# Patient Record
Sex: FEMALE | Employment: FULL TIME | URBAN - METROPOLITAN AREA
[De-identification: names, ages, dates, MRNs, and addresses within clinical notes are randomized per-mention and may not be internally consistent; named-entity substitution may affect disease eponyms.]

---

## 2020-03-04 ENCOUNTER — HOSPITAL ENCOUNTER (OUTPATIENT)
Dept: LAB | Age: 25
Discharge: HOME | End: 2020-03-04

## 2020-03-04 PROCEDURE — 87086 URINE CULTURE/COLONY COUNT: CPT

## 2020-03-06 LAB
BACTERIA SPEC CULT: NORMAL
CC UR VC: NORMAL
SERVICE CMNT-IMP: NORMAL

## 2020-03-18 ENCOUNTER — HOSPITAL ENCOUNTER (OUTPATIENT)
Dept: LAB | Age: 25
Discharge: HOME | End: 2020-03-18

## 2020-03-18 PROCEDURE — 88175 CYTOPATH C/V AUTO FLUID REDO: CPT

## 2020-05-12 ENCOUNTER — HOSPITAL ENCOUNTER (OUTPATIENT)
Dept: ULTRASOUND IMAGING | Age: 25
Discharge: HOME | End: 2020-05-12
Payer: SUBSIDIZED

## 2020-05-12 DIAGNOSIS — R10.2 PELVIC PAIN: ICD-10-CM

## 2020-05-12 PROCEDURE — 76830 TRANSVAGINAL US NON-OB: CPT

## 2020-05-12 PROCEDURE — 76856 US EXAM PELVIC COMPLETE: CPT

## 2020-11-30 ENCOUNTER — OFFICE VISIT (OUTPATIENT)
Dept: OBGYN CLINIC | Age: 25
End: 2020-11-30

## 2020-11-30 DIAGNOSIS — N92.6 MISSED MENSES: Primary | ICD-10-CM

## 2020-11-30 DIAGNOSIS — N91.2 AMENORRHEA: ICD-10-CM

## 2020-11-30 DIAGNOSIS — N89.8 VAGINAL DISCHARGE: ICD-10-CM

## 2020-11-30 LAB
HCG URINE, QL. (POC): NEGATIVE
VALID INTERNAL CONTROL?: YES

## 2020-11-30 PROCEDURE — 99202 OFFICE O/P NEW SF 15 MIN: CPT | Performed by: OBSTETRICS & GYNECOLOGY

## 2020-11-30 PROCEDURE — 81025 URINE PREGNANCY TEST: CPT | Performed by: OBSTETRICS & GYNECOLOGY

## 2020-11-30 RX ORDER — MEDROXYPROGESTERONE ACETATE 10 MG/1
10 TABLET ORAL DAILY
Qty: 10 TAB | Refills: 11 | Status: SHIPPED | OUTPATIENT
Start: 2020-11-30 | End: 2022-01-17 | Stop reason: SDUPTHER

## 2020-11-30 NOTE — PROGRESS NOTES
Amenorrhea note      Marina Tran is a 30 y.o. female who complains of absence of menses. Her current method of family planning is none. The patient is sexually active. She developed this problem approximately 1 year ago. She has only had 3 periods this year. Associated symptoms include pelvic pain. Alleviating factors: none    Aggravating factors: none      Her relevant past medical history:   No past medical history on file. No past surgical history on file. Social History     Occupational History    Not on file   Tobacco Use    Smoking status: Not on file   Substance and Sexual Activity    Alcohol use: Not on file    Drug use: Not on file    Sexual activity: Not on file     No family history on file.     No Known Allergies  Prior to Admission medications    Not on File        Review of Systems - History obtained from the patient  Constitutional: negative for weight loss, fever, night sweats  HEENT: negative for hearing loss, earache, congestion, snoring, sorethroat  CV: negative for chest pain, palpitations, edema  Resp: negative for cough, shortness of breath, wheezing  Breast: negative for breast lumps, nipple discharge, galactorrhea  GI: negative for change in bowel habits, abdominal pain, black or bloody stools  : negative for frequency, dysuria, hematuria  MSK: negative for back pain, joint pain, muscle pain  Skin: negative for itching, rash, hives  Neuro: negative for dizziness, headache, confusion, weakness  Psych: negative for anxiety, depression, change in mood  Heme/lymph: negative for bleeding, bruising, pallor      Objective:  Visit Vitals  LMP 08/05/2020 (Exact Date) Comment: irregular menses          PHYSICAL EXAMINATION    Constitutional  · Appearance: well-nourished, well developed, alert, in no acute distress    HENT  · Head and Face: appears normal    Neck  · Inspection/Palpation: normal appearance, no masses or tenderness  · Lymph Nodes: no lymphadenopathy present  · Thyroid: gland size normal, nontender, no nodules or masses present on palpation    Breasts  · Inspection of Breasts: breasts symmetrical, no skin changes, no discharge present, nipple appearance normal, no skin retraction present  · Palpation of Breasts and Axillae: no masses present on palpation, no breast tenderness  · Axillary Lymph Nodes: no lymphadenopathy present    Gastrointestinal  · Abdominal Examination: abdomen non-tender to palpation, normal bowel sounds, no masses present  · Liver and spleen: no hepatomegaly present, spleen not palpable  · Hernias: no hernias identified    Genitourinary  · External Genitalia: normal appearance for age, + discharge present, no tenderness present, no inflammatory lesions present, no masses present, no atrophy present  · Vagina: normal vaginal vault without central or paravaginal defects, + discharge present, no inflammatory lesions present, no masses present  · Bladder: non-tender to palpation  · Urethra: appears normal  · Cervix: normal   · Uterus: normal size, shape and consistency  · Adnexa: no adnexal tenderness present, no adnexal masses present  · Perineum: perineum within normal limits, no evidence of trauma, no rashes or skin lesions present  · Anus: anus within normal limits, no hemorrhoids present  · Inguinal Lymph Nodes: no lymphadenopathy present    Skin  · General Inspection: no rash, no lesions identified    Neurologic/Psychiatric  · Mental Status:  · Orientation: grossly oriented to person, place and time  · Mood and Affect: mood normal, affect appropriate    Assessment/Plan:   Amenorrhea, possibly due to anovulatrion. Will check pcos labs. Discussed starting metoformin if positive. Will start Provera (can take monthly to get a period). Discussed ovulation predictor kits. If not pregnant in 3 months will notify for 21 day progesterone and possible Femara, and SA.     Vaginal discharge- will fu with Nuswab    Instructions given to pt. Handouts given to pt.

## 2020-12-01 LAB
TESTOST FREE SERPL-MCNC: 2.8 PG/ML (ref 0–4.2)
TESTOST SERPL-MCNC: 40 NG/DL (ref 8–48)
TSH SERPL-ACNC: 0.77 UIU/ML (ref 0.45–4.5)

## 2020-12-02 LAB
A VAGINAE DNA VAG QL NAA+PROBE: NORMAL SCORE
BVAB2 DNA VAG QL NAA+PROBE: NORMAL SCORE
C ALBICANS DNA VAG QL NAA+PROBE: NEGATIVE
C GLABRATA DNA VAG QL NAA+PROBE: NEGATIVE
MEGA1 DNA VAG QL NAA+PROBE: NORMAL SCORE

## 2020-12-03 LAB
17OHP SERPL-MCNC: 36 NG/DL
ALBUMIN SERPL-MCNC: 4.2 G/DL (ref 3.5–5)
ALBUMIN/GLOB SERPL: 1.1 {RATIO} (ref 1.1–2.2)
ALP SERPL-CCNC: 68 U/L (ref 45–117)
ALT SERPL-CCNC: 22 U/L (ref 12–78)
ANION GAP SERPL CALC-SCNC: 7 MMOL/L (ref 5–15)
AST SERPL-CCNC: 17 U/L (ref 15–37)
BILIRUB SERPL-MCNC: 0.4 MG/DL (ref 0.2–1)
BUN SERPL-MCNC: 13 MG/DL (ref 6–20)
BUN/CREAT SERPL: 13 (ref 12–20)
CALCIUM SERPL-MCNC: 9.7 MG/DL (ref 8.5–10.1)
CHLORIDE SERPL-SCNC: 106 MMOL/L (ref 97–108)
CO2 SERPL-SCNC: 26 MMOL/L (ref 21–32)
CREAT SERPL-MCNC: 1.02 MG/DL (ref 0.55–1.02)
GLOBULIN SER CALC-MCNC: 3.9 G/DL (ref 2–4)
GLUCOSE SERPL-MCNC: 84 MG/DL (ref 65–100)
POTASSIUM SERPL-SCNC: 4.4 MMOL/L (ref 3.5–5.1)
PROLACTIN SERPL-MCNC: 8.5 NG/ML
PROT SERPL-MCNC: 8.1 G/DL (ref 6.4–8.2)
SODIUM SERPL-SCNC: 139 MMOL/L (ref 136–145)

## 2020-12-14 ENCOUNTER — TELEPHONE (OUTPATIENT)
Dept: OBGYN CLINIC | Age: 25
End: 2020-12-14

## 2020-12-14 NOTE — TELEPHONE ENCOUNTER
Call received 10:40am    [place]  Interpreting service Ella Franklinter [phone] to assist with communication      30year old patient last seen in the office on 11/30/2020     Patient was advised of MD reviewed labs results and verbalized understanding.( see result notes)    Patient calling to say that she took the medication as prescribed and had a period from  12 2-12/8 and the bleeding has restarted on 12/11 and continues currently. Patient reports changing her pad every 4 hours and cramping at 8 on the pain scale of 1-10.     Patient has not done a upt    Patient is wondering how to proceed    Please advise

## 2020-12-14 NOTE — TELEPHONE ENCOUNTER
Status interpreting service number [id number] [facility]      Patient advised of MD recommendations and verbalized understanding. Patient will check upt and will then take provera at beginning of each month. Patient verbalized understanding.

## 2020-12-14 NOTE — TELEPHONE ENCOUNTER
I would recommend she check a upt. Since she hasn't had a period in a long time she may have irregular bleeding now that she is starting the provera. She can either take the provera the first 10 days of each month to continue to get a period monthly or she can wait and see waht happens over the next 1-2 months and see if continue to come on her period naturally.

## 2021-01-05 NOTE — TELEPHONE ENCOUNTER
01/05/2021  10:13 am-  Follow up phone call for continuation of problems. Patient interpretor service \"Germain\" [id number]. patien tsaid that she had vaginal bleeding (dark brown) light bleeding from 12/23/20-12/29/20 where she had to use 2 pads daily. She is having \"breast tenderness/breasts feel \"heavy\", bilateral breast nipple discharge, hot flashes, feeling \"weak\" and dizziness/nauseated. Pain in mid abdomen 7/10 pain score. Pain in lower back and pain in top of both breasts. She said that she started Provera 1/1/21 and the breast symptoms started after this.       UPT was negative 12/27/20    PLEASE ADVISE

## 2021-01-05 NOTE — TELEPHONE ENCOUNTER
[place] interpretor number [phone] utilized and patient advised again to check upt. Hold medication to start cycle until she knows if negative. If negative, call to schedule next available appointment for consultation.

## 2021-03-03 ENCOUNTER — TELEPHONE (OUTPATIENT)
Dept: OBGYN CLINIC | Age: 26
End: 2021-03-03

## 2021-03-03 NOTE — TELEPHONE ENCOUNTER
Patient is calling triage line asking for [place]istan[place] interpretor. Interpretor contacted \"Juju\" [phone]      Patient was calling to say that she did not purchase the Provera last month and did not get her period but did have a headache. She said she is going to want to have the medication to take this month to start her cycle like she is supposed to take. The Provera was sent on 11/30/20 and has 10 tabs with 11 refills. She was advised to reach out to [address] to have them refill her RX. If no improvement, will need to come in to see Dr. Sonu Kaur.

## 2021-04-05 ENCOUNTER — TELEPHONE (OUTPATIENT)
Dept: OBGYN CLINIC | Age: 26
End: 2021-04-05

## 2021-04-05 NOTE — TELEPHONE ENCOUNTER
Calling for [employer]. Wants to discuss Provera and hair loss, fatigue and acne. Advised needs appt to discuss if having problems. Needs interpretor.     Per Rebecca offer  [id number] at 910 am   or Wed 4/14/21  930    Patient accepted appt  for Wed 4/14/21

## 2021-04-14 ENCOUNTER — OFFICE VISIT (OUTPATIENT)
Dept: OBGYN CLINIC | Age: 26
End: 2021-04-14

## 2021-04-14 VITALS — SYSTOLIC BLOOD PRESSURE: 125 MMHG | WEIGHT: 174.8 LBS | DIASTOLIC BLOOD PRESSURE: 74 MMHG

## 2021-04-14 DIAGNOSIS — N91.2 AMENORRHEA: Primary | ICD-10-CM

## 2021-04-14 DIAGNOSIS — L65.9 HAIR LOSS: ICD-10-CM

## 2021-04-14 PROCEDURE — 99213 OFFICE O/P EST LOW 20 MIN: CPT | Performed by: OBSTETRICS & GYNECOLOGY

## 2021-04-14 NOTE — PROGRESS NOTES
Problem Visit-Limited    Chief Complaint   Follow-up    Patient complains of taking the provera for 7 months and 3 months ago noticed that she feels fatigue, has hair loss, and acne. Patient also states that she still hasn't gotten a regular cycle. She has been taking Provera months (1st 10 days) she has had minimal spotting when she stops. Trying for pregnancy      No past medical history on file. No past surgical history on file. Social History     Occupational History    Not on file   Tobacco Use    Smoking status: Not on file   Substance and Sexual Activity    Alcohol use: Not on file    Drug use: Not on file    Sexual activity: Not on file     No family history on file. No Known Allergies  Prior to Admission medications    Medication Sig Start Date End Date Taking? Authorizing Provider   medroxyPROGESTERone (PROVERA) 10 mg tablet Take 1 Tab by mouth daily.  11/30/20   Issac Velasquez MD        Review of Systems: History obtained from the patient  Constitutional: negative for weight loss, fever, night sweats  Breast: negative for breast lumps, nipple discharge, galactorrhea  GI: negative for change in bowel habits, abdominal pain, black or bloody stools  : negative for frequency, dysuria, hematuria, vaginal discharge  MSK: negative for back pain, joint pain, muscle pain  Skin: negative for itching, rash, hives  Psych: negative for anxiety, depression, change in mood      Objective:  Visit Vitals  /74   Wt 174 lb 12.8 oz (79.3 kg)       Physical Exam:     Constitutional  · Appearance: well-nourished, well developed, alert, in no acute distress    Gastrointestinal  · Abdominal Examination: abdomen non-tender to palpation, normal bowel sounds, no masses present  · Liver and spleen: no hepatomegaly present, spleen not palpable  · Hernias: no hernias identified    Skin  · General Inspection: no rash, no lesions identified    Neurologic/Psychiatric  · Mental Status:  · Orientation: grossly oriented to person, place and time  · Mood and Affect: mood normal, affect appropriate    Assessment/Plan:  Amenorrhea- will repeat tsh and prolactin, will check tsh/fsh/estradiol, 21 day progesterone. If all normal, then will start femara with next cycle if anovulatory. RTO prn if symptoms persist or worsen. Instructions given to pt. Handouts given to pt.

## 2021-04-28 ENCOUNTER — LAB ONLY (OUTPATIENT)
Dept: OBGYN CLINIC | Age: 26
End: 2021-04-28

## 2021-04-28 DIAGNOSIS — N91.2 AMENORRHEA: Primary | ICD-10-CM

## 2021-04-29 LAB
FSH SERPL-ACNC: 4.6 MIU/ML
LH SERPL-ACNC: 17.9 MIU/ML
PROLACTIN SERPL-MCNC: 11.4 NG/ML
T4 SERPL-MCNC: 8.3 UG/DL (ref 4.8–13.9)
TSH SERPL DL<=0.05 MIU/L-ACNC: 0.81 UIU/ML (ref 0.36–3.74)

## 2021-04-30 LAB
ESTRADIOL SERPL-MCNC: 27.7 PG/ML
PROGEST SERPL-MCNC: 0.2 NG/ML
T3RU NFR SERPL: 27 % (ref 24–39)

## 2021-05-20 RX ORDER — LETROZOLE 2.5 MG/1
2.5 TABLET, FILM COATED ORAL DAILY
Qty: 5 TABLET | Refills: 0 | Status: SHIPPED | OUTPATIENT
Start: 2021-05-20 | End: 2021-05-21

## 2021-05-20 NOTE — TELEPHONE ENCOUNTER
----- Message from Earline Kahn MD sent at 4/30/2021  3:38 PM EDT -----  Results abnormal, notify pt of results, anovulatory. Can start femara with next cycle 2.5 mg 1 po daily x 5 days take cycle day 3-7 #5 refills 0. If no pregnancy after 1 month will increase the 5 mg and then 7.5 mg the next month. If still no pregnancy after 3 months then should f/u with PETRA.

## 2021-05-20 NOTE — TELEPHONE ENCOUNTER
Pt notified using . All questions answered and pt verbalized understanding. rx pended to preferred pharmacy.

## 2021-05-21 RX ORDER — LETROZOLE 2.5 MG/1
2.5 TABLET, FILM COATED ORAL DAILY
Qty: 5 TABLET | Refills: 0 | Status: SHIPPED | OUTPATIENT
Start: 2021-05-21 | End: 2022-01-17

## 2021-06-30 ENCOUNTER — HOSPITAL ENCOUNTER (OUTPATIENT)
Dept: LAB | Age: 26
Discharge: HOME | End: 2021-06-30

## 2021-06-30 LAB
COMMENT, HOLDF: NORMAL
SAMPLES BEING HELD,HOLD: NORMAL

## 2021-06-30 PROCEDURE — 36415 COLL VENOUS BLD VENIPUNCTURE: CPT

## 2021-06-30 PROCEDURE — 80048 BASIC METABOLIC PNL TOTAL CA: CPT

## 2021-06-30 PROCEDURE — 84443 ASSAY THYROID STIM HORMONE: CPT

## 2021-07-01 LAB
ANION GAP SERPL CALC-SCNC: 4 MMOL/L (ref 5–15)
BUN SERPL-MCNC: 13 MG/DL (ref 6–20)
BUN/CREAT SERPL: 14 (ref 12–20)
CALCIUM SERPL-MCNC: 9.3 MG/DL (ref 8.5–10.1)
CHLORIDE SERPL-SCNC: 107 MMOL/L (ref 97–108)
CO2 SERPL-SCNC: 28 MMOL/L (ref 21–32)
CREAT SERPL-MCNC: 0.96 MG/DL (ref 0.55–1.02)
GLUCOSE SERPL-MCNC: 78 MG/DL (ref 65–100)
POTASSIUM SERPL-SCNC: 4.4 MMOL/L (ref 3.5–5.1)
SODIUM SERPL-SCNC: 139 MMOL/L (ref 136–145)
TSH SERPL DL<=0.05 MIU/L-ACNC: 0.88 UIU/ML (ref 0.36–3.74)

## 2021-07-03 ENCOUNTER — HOSPITAL ENCOUNTER (EMERGENCY)
Age: 26
Discharge: HOME | End: 2021-07-03
Attending: EMERGENCY MEDICINE
Payer: SUBSIDIZED

## 2021-07-03 ENCOUNTER — APPOINTMENT (OUTPATIENT)
Dept: CT IMAGING | Age: 26
End: 2021-07-03
Attending: EMERGENCY MEDICINE
Payer: SUBSIDIZED

## 2021-07-03 VITALS
RESPIRATION RATE: 16 BRPM | BODY MASS INDEX: 32.74 KG/M2 | OXYGEN SATURATION: 100 % | WEIGHT: 177.91 LBS | TEMPERATURE: 97.3 F | DIASTOLIC BLOOD PRESSURE: 80 MMHG | HEART RATE: 67 BPM | HEIGHT: 62 IN | SYSTOLIC BLOOD PRESSURE: 128 MMHG

## 2021-07-03 DIAGNOSIS — M54.50 ACUTE BILATERAL LOW BACK PAIN WITHOUT SCIATICA: ICD-10-CM

## 2021-07-03 DIAGNOSIS — S09.90XA CLOSED HEAD INJURY, INITIAL ENCOUNTER: Primary | ICD-10-CM

## 2021-07-03 PROCEDURE — 99282 EMERGENCY DEPT VISIT SF MDM: CPT

## 2021-07-03 PROCEDURE — 70450 CT HEAD/BRAIN W/O DYE: CPT

## 2021-07-03 RX ORDER — LIDOCAINE 50 MG/G
PATCH TOPICAL
Qty: 5 EACH | Refills: 0 | Status: SHIPPED | OUTPATIENT
Start: 2021-07-03

## 2021-07-03 RX ORDER — NAPROXEN 500 MG/1
500 TABLET ORAL 2 TIMES DAILY WITH MEALS
Qty: 14 TABLET | Refills: 0 | Status: SHIPPED | OUTPATIENT
Start: 2021-07-03 | End: 2021-07-10

## 2021-07-03 RX ORDER — METHOCARBAMOL 750 MG/1
750 TABLET, FILM COATED ORAL 4 TIMES DAILY
Qty: 20 TABLET | Refills: 0 | Status: SHIPPED | OUTPATIENT
Start: 2021-07-03 | End: 2021-07-08

## 2021-07-03 NOTE — DISCHARGE INSTRUCTIONS
Since you have no primary care provider listed we will refer you to [address] on [facility]. They are open 7 days a week. They are a part of the [employer]. They will have access to x-rays and labs you had done here in the Emergency Room.
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
## 2021-07-23 ENCOUNTER — OFFICE VISIT (OUTPATIENT)
Dept: FAMILY MEDICINE CLINIC | Age: 26
End: 2021-07-23
Payer: SUBSIDIZED

## 2021-07-23 VITALS
WEIGHT: 181 LBS | BODY MASS INDEX: 33.31 KG/M2 | TEMPERATURE: 98.3 F | OXYGEN SATURATION: 98 % | RESPIRATION RATE: 18 BRPM | HEIGHT: 62 IN | HEART RATE: 70 BPM | DIASTOLIC BLOOD PRESSURE: 75 MMHG | SYSTOLIC BLOOD PRESSURE: 113 MMHG

## 2021-07-23 DIAGNOSIS — M54.50 ACUTE MIDLINE LOW BACK PAIN WITHOUT SCIATICA: ICD-10-CM

## 2021-07-23 DIAGNOSIS — S06.0X0A CONCUSSION WITHOUT LOSS OF CONSCIOUSNESS, INITIAL ENCOUNTER: Primary | ICD-10-CM

## 2021-07-23 PROCEDURE — 99203 OFFICE O/P NEW LOW 30 MIN: CPT | Performed by: STUDENT IN AN ORGANIZED HEALTH CARE EDUCATION/TRAINING PROGRAM
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
## 2021-07-23 NOTE — LETTER
NOTIFICATION RETURN TO WORK / SCHOOL    7/23/2021 10:32 AM    Ms. Sommer Hardy  [address] [facility] [address] [address]      To Whom It May Concern:    Sommer Hardy is currently under the care of [facility]. She will return to work/school on: pending her next visit on 7/30/21      If there are questions or concerns please have the patient contact our office.         Sincerely,        Burke Smith, DO
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
## 2021-07-23 NOTE — PATIENT INSTRUCTIONS
Conmoción cerebral: Instrucciones de cuidado  Concussion: Care Instructions  Instrucciones de cuidado    Rachell conmoción es un tipo de lesión cerebral. Ocurre cuando se recibe un violeta golpe en la jose l. El impacto puede sacudir o agitar el cerebro contra el cráneo. Mosses interrumpe las actividades normales del cerebro. Aunque podría tener cortadas o moretones en la jose l o la melodie, es posible que no tenga otras señales visibles de rachell lesión cerebral. En la mayoría de [place], [place] daño al cerebro debido a rachell conmoción cerebral no puede ser [employer] en pruebas johanna rachell tomografía computarizada (CT, por maurisio siglas en inglés) o rachell resonancia magnética nuclear (MRI, por maurisio siglas en [facility]). Es posible que arjun unas semanas tenga poca Renee Rg, problemas para dormir, dolor de jose l, zumbido Leatha [place] oídos o Tk. Es posible que [place] se sienta ansioso, malhumorado o deprimido. Puede tener problemas con la memoria y la concentración. Estos síntomas son comunes después de rachell conmoción. Deberían mejorar lentamente con el tiempo. A veces, esto lleva semanas o incluso meses. Alguna persona que viva con usted debe saber cómo cuidarlo. Por favor, comparta esta información con la persona que estará encargada de proporcionarle ayuda cuando sea necesario. La atención de seguimiento es rachell parte clave de iverson tratamiento y seguridad. Asegúrese de hacer y acudir a todas las citas, y llame a iverson médico si está teniendo problemas. También es rachell buena idea saber los resultados de maurisio exámenes y mantener rachell lista de los medicamentos que chanell. Cómo puede cuidarse en el hogar? Control del dolor  · Colóquese hielo o rachell compresa fría en la parte de la jose l que le duele de 10 a 20 minutos cada vez. Póngase un paño dennis entre el hielo y la piel. · Sea kaushik con los medicamentos. Maribel y siga todas las indicaciones de la [place].   ? Si el médico le recetó un analgésico (medicamento para el dolor), tómelo de la forma indicada. ? Si usted no está tomando un analgésico recetado, pregúntele a iverson médico si puede lillian un medicamento de [employer]. Recuperación  · Siga las instrucciones de iverson médico. Él o nishi le dirá si necesita que alguien lo vigile atentamente arjun las siguientes 24 horas o más Gracie. · El descanso es la mejor manera de recuperarse de rachell conmoción. Debe descansar iverson cuerpo y iverson cerebro:  ? Duerma lo suficiente por la noche. Y tome descansos arjun el día. ? Evite actividades que sam física o mentalmente exigentes. [place] incluye tareas domésticas, ejercicio, tareas escolares, videojuegos, mensajes de texto o usar la computadora. ? Puede que tenga que cambiar iverson horario de trabajo o de la escuela mientras se recupera. ? Reanude maurisio actividades normales en forma gradual. No intente hacer muchas actividades a la vez. · No brandon alcohol ni consuma drogas ilegales. El alcohol y las drogas ilegales pueden hacer más lenta iverson recuperación. Y pueden aumentar el riesgo de rachell segunda lesión cerebral.  · Evite hacer actividades que pudieran provocar otra conmoción cerebral. Siga las indicaciones de iverson médico sobre cómo volver gradualmente a la actividad y los deportes. · Pregúntele a iverson médico cuándo puede conducir un auto, montar en bicicleta u operar maquinarias. Cómo debe volver a hacer actividad? Iverson regreso a la actividad puede comenzar después de 1 o 2 días de descanso físico y mental. Después del reposo, usted puede aumentar iverson actividad gradualmente siempre y cuando no le cause nuevos síntomas ni le empeore los síntomas. Keshav Hasten y especialistas en conmoción cerebral sugieren pasos a seguir para volver a hacer deporte después de rachell conmoción. Utilice los siguientes pasos johanna guía. Usted debe avanzar lentamente por los siguientes niveles de [place]:  1. Actividad limitada.  Usted puede participar en actividades diarias siempre y cuando la actividad no aumente maurisio síntomas ni le cause nuevos síntomas. 2. Barbara Flavio ligera. [employer] [facility] puede incluir caminar, nadar o realizar otro ejercicio a menos del 70% de iverson frecuencia Dorotha Gell. No se incluye ningún tipo de entrenamiento de resistencia en deisy paso. 3. Ejercicio específico al deporte. [employer] [facility] incluye entrenamiento de correr o de patinar (según el deporte), christiana sin impacto en la jose l. 4. Ejercicios de entrenamiento sin contacto. [employer] [facility] incluye ejercicios de entrenamiento más complejos, johanna samantha Loo. Usted también puede comenzar un entrenamiento de resistencia ligero. 5. Práctica sin límites de contacto. Usted puede participar en el entrenamiento normal.  6. Regreso al juego normal. Deisy es el último paso y le permite participar en el juego normal.  Observe y siga atentamente iverson progreso. Debe lillian por lo menos 6 días para progresar desde la actividad ligera al juego normal.  Asegúrese de que pueda permanecer sin síntomas en cada nuevo nivel de actividad al menos 24 horas, o tanto tiempo johanna iverson médico le diga, antes de [employer]. Si andrea o más síntomas vuelven a aparecer, regrese a un nivel de actividad amy arjun al menos 24 horas. No avance hasta que todos los síntomas hayan desaparecido. Cuándo debe pedir ayuda? Llame al 911 en cualquier momento que considere que necesita atención de [place]. Por ejemplo, llame si:    · Tiene un episodio de convulsiones.     · Se desmayó (perdió el conocimiento).   · Se siente confuso o no puede mantenerse despierto. Llame a iverson médico ahora mismo o busque atención médica inmediata si:    · Tiene nuevo o peor vómito.     · Se siente menos alerta.     · Tiene nueva debilidad o entumecimiento en cualquier parte del cuerpo. Preste especial atención a los [facility], y asegúrese de comunicarse con iverson médico si:    · No mejora johanna esperaba.     · Tiene nuevos síntomas, johanna laura de [place], problemas para concentrarse o cambios en el estado de ánimo.    
Dónde puede encontrar más información en inglés? Ivana Clear a [email or web address].gray.com/  Enrique [id number] en la búsqueda para aprender más acerca de \"Conmoción cerebral: Instrucciones de cuidado. \"  Revisado: 4 agosto, 2020               Versión del contenido: 12.8  © 2006-2021 Healthwise, [employer]. Las instrucciones de cuidado fueron adaptadas bajo licencia por Good [facility] Connections (which disclaims liability or warranty for this information). Si usted tiene [place] afección médica o sobre estas instrucciones, siempre pregunte a iverson profesional de perez. [employer], [employer] niega toda garantía o responsabilidad por iversno uso de esta información.

## 2021-07-27 ENCOUNTER — TELEPHONE (OUTPATIENT)
Dept: FAMILY MEDICINE CLINIC | Age: 26
End: 2021-07-27

## 2021-07-27 NOTE — TELEPHONE ENCOUNTER
Last OV not faxed to [phone] per verbal request of Abelardo Marks with  [employer] workmen's comp .  Claim # [id number]      patrice phone # [phone] Breathing spontaneous and unlabored. Breath sounds clear and equal bilaterally with regular rhythm.

## 2021-08-03 ENCOUNTER — OFFICE VISIT (OUTPATIENT)
Dept: FAMILY MEDICINE CLINIC | Age: 26
End: 2021-08-03
Payer: SUBSIDIZED

## 2021-08-03 VITALS
BODY MASS INDEX: 33.75 KG/M2 | HEART RATE: 73 BPM | OXYGEN SATURATION: 99 % | DIASTOLIC BLOOD PRESSURE: 72 MMHG | RESPIRATION RATE: 16 BRPM | HEIGHT: 62 IN | WEIGHT: 183.4 LBS | SYSTOLIC BLOOD PRESSURE: 114 MMHG | TEMPERATURE: 97.9 F

## 2021-08-03 DIAGNOSIS — F43.0 ANXIETY IN ACUTE STRESS REACTION: ICD-10-CM

## 2021-08-03 DIAGNOSIS — S06.0X0A CONCUSSION WITHOUT LOSS OF CONSCIOUSNESS, INITIAL ENCOUNTER: Primary | ICD-10-CM

## 2021-08-03 DIAGNOSIS — M54.9 ACUTE UPPER BACK PAIN: ICD-10-CM

## 2021-08-03 DIAGNOSIS — F41.1 ANXIETY IN ACUTE STRESS REACTION: ICD-10-CM

## 2021-08-03 DIAGNOSIS — G44.209 ACUTE NON INTRACTABLE TENSION-TYPE HEADACHE: ICD-10-CM

## 2021-08-03 PROCEDURE — 99204 OFFICE O/P NEW MOD 45 MIN: CPT | Performed by: STUDENT IN AN ORGANIZED HEALTH CARE EDUCATION/TRAINING PROGRAM

## 2021-08-03 RX ORDER — BACLOFEN 10 MG/1
5 TABLET ORAL 3 TIMES DAILY
Qty: 20 TABLET | Refills: 0 | Status: SHIPPED | OUTPATIENT
Start: 2021-08-03 | End: 2022-05-11

## 2021-08-03 NOTE — PROGRESS NOTES
Trell Smith is a 30 y.o. female    Chief Complaint   Patient presents with    Concussion     Patient is coming in for a concussion follow up. She states that her headaches are still the same. Patient states that she has noticed lately that before going to bed she always feels like scared and anxious. She states that she has trouble remembering things. No other concerns. 1. Have you been to the ER, urgent care clinic since your last visit? Hospitalized since your last visit? No    2. Have you seen or consulted any other health care providers outside of the [address] [place] [address] since your last visit? Include any pap smears or colon screening. No      Visit Vitals  /72 (BP 1 Location: Right upper arm, BP Patient Position: Sitting)   Pulse 73   Temp 97.9 °F (36.6 °C) (Oral)   Resp 16   Ht 5' 2\" (1.575 m)   Wt 183 lb 6.4 oz (83.2 kg)   SpO2 99%   BMI 33.54 kg/m²           Health Maintenance Due   Topic Date Due    Hepatitis C Screening  Never done    COVID-19 Vaccine (1) Never done    DTaP/Tdap/Td series (1 - Tdap) Never done         Medication Reconciliation completed, changes noted.   Please  Update medication list.

## 2021-08-03 NOTE — PATIENT INSTRUCTIONS
Conmoción cerebral: Instrucciones de cuidado  Concussion: Care Instructions  Instrucciones de cuidado    Rachell conmoción es un tipo de lesión cerebral. Ocurre cuando se recibe un violeta golpe en la jose l. El impacto puede sacudir o agitar el cerebro contra el cráneo. [place] interrumpe las actividades normales del cerebro. Aunque podría tener cortadas o moretones en la jose l o la melodie, es posible que no tenga otras señales visibles de rachell lesión cerebral. En la mayoría de [place], [place] daño al cerebro debido a rachell conmoción cerebral no puede ser [employer] en pruebas johanna rachell tomografía computarizada (CT, por maurisio siglas en inglés) o rachell resonancia magnética nuclear (MRI, por maurisio siglas en [facility]). Es posible que arjun unas semanas tenga poca Renee Rg, problemas para dormir, dolor de jose l, zumbido Leatha [employer] oídos o Tk. Es posible que [place] se sienta ansioso, malhumorado o deprimido. Puede tener problemas con la memoria y la concentración. Estos síntomas son comunes después de rachell conmoción. Deberían mejorar lentamente con el tiempo. A veces, esto lleva semanas o incluso meses. Alguna persona que viva con usted debe saber cómo cuidarlo. Por favor, comparta esta información con la persona que estará encargada de proporcionarle ayuda cuando sea necesario. La atención de seguimiento es rachell parte clave de iverson tratamiento y seguridad. Asegúrese de hacer y acudir a todas las citas, y llame a iverson médico si está teniendo problemas. También es rachell buena idea saber los resultados de maurisio exámenes y mantener rachell lista de los medicamentos que chanell. Cómo puede cuidarse en el hogar? Control del dolor  · Colóquese hielo o rachell compresa fría en la parte de la jose l que le duele de 10 a 20 minutos cada vez. Póngase un paño dennis entre el hielo y la piel. · Sea kaushik con los medicamentos. Maribel y siga todas las indicaciones de la [place].   ? Si el médico le recetó un analgésico (medicamento para el dolor), tómelo de la forma indicada. ? Si usted no está tomando un analgésico recetado, pregúntele a iverson médico si puede lillian un medicamento de [employer]. Recuperación  · Siga las instrucciones de iverson médico. Él o nishi le dirá si necesita que alguien lo vigile atentamente arjun las siguientes 24 horas o más Gracie. · El descanso es la mejor manera de recuperarse de rachell conmoción. Debe descansar iverson cuerpo y iverson cerebro:  ? Duerma lo suficiente por la noche. Y tome descansos arjun el día. ? Evite actividades que sam física o mentalmente exigentes. [place] incluye tareas domésticas, ejercicio, tareas escolares, videojuegos, mensajes de texto o usar la computadora. ? Puede que tenga que cambiar iverson horario de trabajo o de la escuela mientras se recupera. ? Reanude maurisio actividades normales en forma gradual. No intente hacer muchas actividades a la vez. · No brandon alcohol ni consuma drogas ilegales. El alcohol y las drogas ilegales pueden hacer más lenta iverson recuperación. Y pueden aumentar el riesgo de rachell segunda lesión cerebral.  · Evite hacer actividades que pudieran provocar otra conmoción cerebral. Siga las indicaciones de iverson médico sobre cómo volver gradualmente a la actividad y los deportes. · Pregúntele a iverson médico cuándo puede conducir un auto, montar en bicicleta u operar maquinarias. Cómo debe volver a hacer actividad? Iverson regreso a la actividad puede comenzar después de 1 o 2 días de descanso físico y mental. Después del reposo, usted puede aumentar iverson actividad gradualmente siempre y cuando no le cause nuevos síntomas ni le empeore los síntomas. Carlene Gill y especialistas en conmoción cerebral sugieren pasos a seguir para volver a hacer deporte después de rachell conmoción. Utilice los siguientes pasos johanna guía. Usted debe avanzar lentamente por los siguientes niveles de [place]:  1. Actividad limitada.  Usted puede participar en actividades diarias siempre y cuando la actividad no aumente maurisio síntomas ni le cause nuevos síntomas. 2. Maximo Maryuri ligera. [place] puede incluir caminar, nadar o realizar otro ejercicio a menos del 70% de iverson frecuencia Cristiane Freiberg. No se incluye ningún tipo de entrenamiento de resistencia en deisy paso. 3. Ejercicio específico al deporte. [place] incluye entrenamiento de correr o de patinar (según el deporte), christiana sin impacto en la jose l. 4. Ejercicios de entrenamiento sin contacto. [place] incluye ejercicios de entrenamiento más complejos, johanna samantha Loo. Usted también puede comenzar un entrenamiento de resistencia ligero. 5. Práctica sin límites de contacto. Usted puede participar en el entrenamiento normal.  6. Regreso al juego normal. [place] es el último paso y le permite participar en el juego normal.  Observe y siga atentamente iverson progreso. Debe lillian por lo menos 6 días para progresar desde la actividad ligera al juego normal.  Asegúrese de que pueda permanecer sin síntomas en cada nuevo nivel de actividad al menos 24 horas, o tanto tiempo johanna iverson médico le diga, antes de [employer]. Si andrea o más síntomas vuelven a aparecer, regrese a un nivel de actividad amy arjun al menos 24 horas. No avance hasta que todos los síntomas hayan desaparecido. Cuándo debe pedir ayuda? Llame al 911 en cualquier momento que considere que necesita atención de [place]. Por ejemplo, llame si:    · Tiene un episodio de convulsiones.     · Se desmayó (perdió el conocimiento).   · Se siente confuso o no puede mantenerse despierto. Llame a iverson médico ahora mismo o busque atención médica inmediata si:    · Tiene nuevo o peor vómito.     · Se siente menos alerta.     · Tiene nueva debilidad o entumecimiento en cualquier parte del cuerpo. Preste especial atención a los [facility], y asegúrese de comunicarse con iverson médico si:    · No mejora johanna esperaba.     · Tiene nuevos síntomas, johanna laura de [place], problemas para concentrarse o cambios en el estado de ánimo.    
Dónde puede encontrar más información en inglés? Ingris Hopper a [email or web address].com/  Enrique [id number] en la búsqueda para aprender más acerca de \"Conmoción cerebral: Instrucciones de cuidado. \"  Revisado: 4 agosto, 2020               Versión del contenido: 12.8  © 2146-1744 Healthwise, [employer]. Las instrucciones de cuidado fueron adaptadas bajo licencia por Good [facility] Connections (which disclaims liability or warranty for this information). Si usted tiene [place] afección médica o sobre estas instrucciones, siempre pregunte a iverson profesional de perez. [employer], [employer] niega toda garantía o responsabilidad por iverson uso de esta información.
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
## 2021-08-03 NOTE — LETTER
NOTIFICATION OF EXCUSE FROM WORK / SCHOOL    8/3/2021    Ms. Sommer Hardy  [address] [address]      To Whom It May Concern:    Sommer Hardy was under the care of [facility] from 08/03/21 to 08/20/21. She will be re-evauated in our clinic in two weeks for possible return to work. If there are questions or concerns please have the patient contact our office.         Sincerely,      Bridget Tian MD

## 2021-08-10 ENCOUNTER — TELEPHONE (OUTPATIENT)
Dept: OBGYN CLINIC | Age: 26
End: 2021-08-10

## 2021-08-10 NOTE — TELEPHONE ENCOUNTER
Message left at 803am    30year old patient last seen in the office on 4/14/2021    Patient left a voice mail message but did not specify what she needed    This nurse attempted to reach the patient and left a detailed message for the patient to call the office back in the am

## 2021-08-11 ENCOUNTER — TELEPHONE (OUTPATIENT)
Dept: OBGYN CLINIC | Age: 26
End: 2021-08-11

## 2021-08-11 NOTE — TELEPHONE ENCOUNTER
Call received at 645am    30year old patient last seen in the office on 4/14/2021    Patient requesting prescription for Femara and provera         Guru #[id number] assisting with the communication . Patient was advised that she should have prescription available    This nurse called the pharmacy and was advised that the patient can  the prescriptions in 2 hours    Patient was advised and verbalized understanding.

## 2021-08-31 ENCOUNTER — TELEPHONE (OUTPATIENT)
Dept: FAMILY MEDICINE CLINIC | Age: 26
End: 2021-08-31

## 2021-08-31 NOTE — TELEPHONE ENCOUNTER
Faxed this [employer] message back to the fax number provided and informed them to fax a medical record request to the office.

## 2021-08-31 NOTE — TELEPHONE ENCOUNTER
----- Message from [place] sent at 8/31/2021  8:48 AM EDT -----  Regarding: Dr. Destini Quick Message/Vendor Calls    Caller's first and last name:Adam ibrahim/ Honey Avendaño (workmans comp claim)      Reason for call:  Requesting a call back to see if patient was seen after DOS 7/23/21. If patient had been seen please fax office visit notes to [phone].        Callback required yes/no and why:  yes      Best contact number(s): [phone]   (fax # [phone])      Details to clarify the request:  Claim #[id number]      [place]

## 2021-09-14 ENCOUNTER — TELEPHONE (OUTPATIENT)
Dept: OBGYN CLINIC | Age: 26
End: 2021-09-14

## 2021-09-14 RX ORDER — LETROZOLE 2.5 MG/1
TABLET, FILM COATED ORAL
Qty: 10 TABLET | Refills: 0 | Status: SHIPPED | OUTPATIENT
Start: 2021-09-14 | End: 2022-01-17

## 2021-09-14 RX ORDER — LETROZOLE 2.5 MG/1
TABLET, FILM COATED ORAL
Qty: 15 TABLET | Refills: 0 | Status: SHIPPED | OUTPATIENT
Start: 2021-09-14 | End: 2022-01-17

## 2021-09-14 NOTE — TELEPHONE ENCOUNTER
Increase to 5 mg daily x 5 days, cycle days 3-7. If no pregnancy after next month then should take 7.5 mg po  x 5 days, cycle days 3-7. If no pregnancy after this dose, then should follow up with [facility].

## 2021-09-14 NOTE — TELEPHONE ENCOUNTER
Message left at 3:32pm on 9/13/2021      27year old patient last seen in the office on 4/14/2021      Patient left a message about a medication and needs a       This nurse called the patient back using  # [phone] to assist with the communication. Patient calling to ask about needing a refill of her medication Femara          Patient is asking for a refill of her Femara 2.5mg      ? Ok to refill ?  Ov    Please advise      Patient does have provera    Thank you

## 2021-09-14 NOTE — TELEPHONE ENCOUNTER
Davon Bell #[id number] assisted with communication    Patient was advised of MD recommendations and prescriptions sent as per Md order. Patient was provided with instructions and verbalized understanding.

## 2021-11-03 ENCOUNTER — HOSPITAL ENCOUNTER (OUTPATIENT)
Dept: LAB | Age: 26
Discharge: HOME | End: 2021-11-03

## 2021-11-03 PROCEDURE — 88142 CYTOPATH C/V THIN LAYER: CPT

## 2022-01-17 ENCOUNTER — OFFICE VISIT (OUTPATIENT)
Dept: OBGYN CLINIC | Age: 27
End: 2022-01-17
Payer: SUBSIDIZED

## 2022-01-17 VITALS — SYSTOLIC BLOOD PRESSURE: 143 MMHG | DIASTOLIC BLOOD PRESSURE: 79 MMHG | WEIGHT: 190 LBS | BODY MASS INDEX: 34.75 KG/M2

## 2022-01-17 DIAGNOSIS — N91.2 AMENORRHEA: Primary | ICD-10-CM

## 2022-01-17 LAB
HCG URINE, QL. (POC): NEGATIVE
VALID INTERNAL CONTROL?: YES

## 2022-01-17 PROCEDURE — 81025 URINE PREGNANCY TEST: CPT | Performed by: OBSTETRICS & GYNECOLOGY

## 2022-01-17 PROCEDURE — 99213 OFFICE O/P EST LOW 20 MIN: CPT | Performed by: OBSTETRICS & GYNECOLOGY

## 2022-01-17 RX ORDER — MEDROXYPROGESTERONE ACETATE 10 MG/1
10 TABLET ORAL DAILY
Qty: 10 TABLET | Refills: 0 | Status: SHIPPED | OUTPATIENT
Start: 2022-01-17 | End: 2022-01-27

## 2022-01-17 RX ORDER — LETROZOLE 2.5 MG/1
7.5 TABLET, FILM COATED ORAL DAILY
Qty: 15 TABLET | Refills: 0 | Status: SHIPPED | OUTPATIENT
Start: 2022-01-17 | End: 2022-05-11

## 2022-01-17 NOTE — PROGRESS NOTES
Amenorrhea note      Oma Villalobos is a 27 y.o. female who complains of absence of menses. Previous labs showed anovulation, she has tried Femara 2.5 and 5 mg. No period since October. Her current method of family planning is none. The patient is sexually active. She developed this problem approximately 3 months ago. Alleviating factors: none     Aggravating factors: none      She reports taking Femara in October. Her relevant past medical history:   No past medical history on file. No past surgical history on file. Social History     Occupational History    Not on file   Tobacco Use    Smoking status: Never Smoker    Smokeless tobacco: Never Used   Substance and Sexual Activity    Alcohol use: Not on file    Drug use: Not on file    Sexual activity: Not on file     No family history on file. No Known Allergies  Prior to Admission medications    Medication Sig Start Date End Date Taking? Authorizing Provider   [facility]) 2.5 mg tablet Take two tabs daily for 5 days cycle days 3-7  Patient not taking: Reported on 1/17/2022 9/14/21   Robinson Segura MD   letrozole [facility]) 2.5 mg tablet Take three tab by mouth daily  for 5 days , cycle days 3-7  Patient not taking: Reported on 1/17/2022 9/14/21   Robinson Segura MD   baclofen (LIORESAL) 10 mg tablet Take 0.5 Tablets by mouth three (3) times daily. Take half a tablet at night as needed for neck pain. Do not take if you are going to be driving or operating heavy machinery. Patient not taking: Reported on 1/17/2022 8/3/21   Sumit Dias MD   lidocaine (Lidoderm) 5 % Apply patch to the affected area for 12 hours a day and remove for 12 hours a day. Patient not taking: Reported on 7/23/2021 7/3/21   Regan Mann PA   letrozole [facility]) 2.5 mg tablet Take 1 Tablet by mouth daily.  Take cycle days 3-7  Patient not taking: Reported on 1/17/2022 5/21/21   Robinson Segura MD   medroxyPROGESTERone (PROVERA) 10 mg tablet Take 1 Tab by mouth daily. Patient not taking: Reported on 1/17/2022 11/30/20   Yenny Garcia MD        Review of Systems - History obtained from the patient  Constitutional: negative for weight loss, fever, night sweats  HEENT: negative for hearing loss, earache, congestion, snoring, sorethroat  CV: negative for chest pain, palpitations, edema  Resp: negative for cough, shortness of breath, wheezing  Breast: negative for breast lumps, nipple discharge, galactorrhea  GI: negative for change in bowel habits, abdominal pain, black or bloody stools  : negative for frequency, dysuria, hematuria  MSK: negative for back pain, joint pain, muscle pain  Skin: negative for itching, rash, hives  Neuro: negative for dizziness, headache, confusion, weakness  Psych: negative for anxiety, depression, change in mood  Heme/lymph: negative for bleeding, bruising, pallor      Objective:  Visit Vitals  BP (!) 143/79   Wt 190 lb (86.2 kg)   LMP 10/05/2021 (Exact Date)   BMI 34.75 kg/m²          PHYSICAL EXAMINATION    Constitutional  · Appearance: well-nourished, well developed, alert, in no acute distress    HENT  · Head and Face: appears normal    Neck  · Inspection/Palpation: normal appearance, no masses or tenderness  · Lymph Nodes: no lymphadenopathy present  · Thyroid: gland size normal, nontender, no nodules or masses present on palpation    Gastrointestinal  · Abdominal Examination: abdomen non-tender to palpation, normal bowel sounds, no masses present  · Liver and spleen: no hepatomegaly present, spleen not palpable  · Hernias: no hernias identified    Skin  · General Inspection: no rash, no lesions identified    Neurologic/Psychiatric  · Mental Status:  · Orientation: grossly oriented to person, place and time  · Mood and Affect: mood normal, affect appropriate    Assesment/Plan:  Amenorrhea 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   If no pregnancy this time she will follow-up with [facility] infertility. We will give Provera for 10 days to induce withdrawal bleeding    Instructions given to pt. Handouts given to pt.

## 2022-03-30 ENCOUNTER — NURSE TRIAGE (OUTPATIENT)
Dept: OTHER | Facility: CLINIC | Age: 27
End: 2022-03-30

## 2022-03-30 NOTE — TELEPHONE ENCOUNTER
Received call from [facility] [place] at [facility] with Red Flag Complaint. Subjective: Caller states \"I've been having this stomach ache for 6 months. In the last 24 hours it's worse. I feel the stomach pain in the upper part of my stomach, it radiates to the back. I feel back pain also. Occasionally I feel it during the day, but in the morning I'm not able to sleep when I have it. \"     [place] Chana Castro #[id number] on the line    Current Symptoms: back pain x 3 weeks upper abdominal pain x 6 months-worsening in the last 24 hours-states \"severe\", NO urinary symptoms, bowel movement yesterday-soft in consistency    Hx of constipation    Onset: 6 months ago; worsening    Associated Symptoms: constipation    Pain Severity: 6/10; pressure radiating to back; constant- states \"severe\"    Temperature: Denies    What has been tried: Pepto Bismol    LMP: NA-Hormone issues Pregnant: No    Recommended disposition: Go to ED Now. Patient is agreeable and states she will have someone drive her. Care advice provided, patient verbalizes understanding; denies any other questions or concerns; instructed to call back for any new or worsening symptoms. Patient/caller agrees to proceed to [facility] Emergency Department. Attention Provider: Thank you for allowing me to participate in the care of your patient. The patient was connected to triage in response to information provided to the [facility]. Please do not respond through this encounter as the response is not directed to a shared pool.     Reason for Disposition   SEVERE abdominal pain (e.g., excruciating)    Protocols used: ABDOMINAL PAIN - [facility] - QUETA ROMO

## 2022-04-12 ENCOUNTER — APPOINTMENT (OUTPATIENT)
Dept: GENERAL RADIOLOGY | Age: 27
End: 2022-04-12
Attending: EMERGENCY MEDICINE
Payer: COMMERCIAL

## 2022-04-12 ENCOUNTER — HOSPITAL ENCOUNTER (EMERGENCY)
Age: 27
Discharge: HOME | End: 2022-04-12
Attending: EMERGENCY MEDICINE
Payer: COMMERCIAL

## 2022-04-12 VITALS
DIASTOLIC BLOOD PRESSURE: 76 MMHG | HEART RATE: 88 BPM | OXYGEN SATURATION: 100 % | TEMPERATURE: 98.4 F | RESPIRATION RATE: 20 BRPM | SYSTOLIC BLOOD PRESSURE: 108 MMHG

## 2022-04-12 DIAGNOSIS — J20.9 ACUTE BRONCHITIS, UNSPECIFIED ORGANISM: Primary | ICD-10-CM

## 2022-04-12 LAB
DEPRECATED S PYO AG THROAT QL EIA: NEGATIVE
FLUAV AG NPH QL IA: NEGATIVE
FLUBV AG NOSE QL IA: NEGATIVE
SARS-COV-2, XPLCVT: NOT DETECTED
SOURCE, COVRS: NORMAL

## 2022-04-12 PROCEDURE — 87804 INFLUENZA ASSAY W/OPTIC: CPT

## 2022-04-12 PROCEDURE — 74011000250 HC RX REV CODE- 250: Performed by: EMERGENCY MEDICINE

## 2022-04-12 PROCEDURE — 99284 EMERGENCY DEPT VISIT MOD MDM: CPT

## 2022-04-12 PROCEDURE — 94640 AIRWAY INHALATION TREATMENT: CPT

## 2022-04-12 PROCEDURE — U0005 INFEC AGEN DETEC AMPLI PROBE: HCPCS

## 2022-04-12 PROCEDURE — 87880 STREP A ASSAY W/OPTIC: CPT

## 2022-04-12 PROCEDURE — 93005 ELECTROCARDIOGRAM TRACING: CPT

## 2022-04-12 PROCEDURE — 74011636637 HC RX REV CODE- 636/637: Performed by: EMERGENCY MEDICINE

## 2022-04-12 PROCEDURE — 71046 X-RAY EXAM CHEST 2 VIEWS: CPT

## 2022-04-12 PROCEDURE — 87070 CULTURE OTHR SPECIMN AEROBIC: CPT

## 2022-04-12 RX ORDER — PREDNISONE 20 MG/1
60 TABLET ORAL
Status: COMPLETED | OUTPATIENT
Start: 2022-04-12 | End: 2022-04-12

## 2022-04-12 RX ORDER — ALBUTEROL SULFATE 90 UG/1
2 AEROSOL, METERED RESPIRATORY (INHALATION)
Qty: 18 G | Refills: 0 | Status: SHIPPED | OUTPATIENT
Start: 2022-04-12 | End: 2022-05-11

## 2022-04-12 RX ORDER — PREDNISONE 50 MG/1
50 TABLET ORAL DAILY
Qty: 3 TABLET | Refills: 0 | Status: SHIPPED | OUTPATIENT
Start: 2022-04-12 | End: 2022-04-15

## 2022-04-12 RX ORDER — AZITHROMYCIN 250 MG/1
TABLET, FILM COATED ORAL
Qty: 6 TABLET | Refills: 0 | Status: SHIPPED | OUTPATIENT
Start: 2022-04-12 | End: 2022-04-17

## 2022-04-12 RX ADMIN — PREDNISONE 60 MG: 20 TABLET ORAL at 01:07

## 2022-04-12 RX ADMIN — ALBUTEROL SULFATE 1 DOSE: 2.5 SOLUTION RESPIRATORY (INHALATION) at 01:07

## 2022-04-12 NOTE — ED PROVIDER NOTES
HPI   28 yo F presents with coughing onset Friday. C/o chills, no fever. Cough productive of phlegm. Took ibuprofen for pain at 9pm and cough syrup. C/o cp and sob with cough. No known sick contacts. LMP-October due to irregular periods  Had covid vaccines  Denies tobacco use  No past medical history on file. No past surgical history on file. No family history on file. Social History     Socioeconomic History    Marital status: SINGLE     Spouse name: Not on file    Number of children: Not on file    Years of education: Not on file    Highest education level: Not on file   Occupational History    Not on file   Tobacco Use    Smoking status: Never Smoker    Smokeless tobacco: Never Used   Substance and Sexual Activity    Alcohol use: Not on file    Drug use: Not on file    Sexual activity: Not on file   Other Topics Concern    Not on file   Social History Narrative    Not on file     Social Determinants of Health     Financial Resource Strain:     Difficulty of Paying Living Expenses: Not on file   Food Insecurity:     Worried About Running Out of Food in the Last Year: Not on file    Kenji of Food in the Last Year: Not on file   Transportation Needs:     Lack of Transportation (Medical): Not on file    Lack of Transportation (Non-Medical):  Not on file   Physical Activity:     Days of Exercise per Week: Not on file    Minutes of Exercise per Session: Not on file   Stress:     Feeling of Stress : Not on file   Social Connections:     Frequency of Communication with Friends and Family: Not on file    Frequency of Social Gatherings with Friends and Family: Not on file    Attends Islam Services: Not on file    Active Member of Clubs or Organizations: Not on file    Attends Club or Organization Meetings: Not on file    Marital Status: Not on file   Intimate Partner Violence:     Fear of Current or Ex-Partner: Not on file    Emotionally Abused: Not on file    Physically Abused: Not on file    Sexually Abused: Not on file   Housing Stability:     Unable to Pay for Housing in the Last Year: Not on file    Number of Places Lived in the Last Year: Not on file    Unstable Housing in the Last Year: Not on file         ALLERGIES: Patient has no known allergies. Review of Systems   Constitutional: Positive for chills. Negative for fever. HENT: Positive for congestion, rhinorrhea and sore throat. Respiratory: Positive for cough, chest tightness and shortness of breath. Cardiovascular: Negative for leg swelling. Gastrointestinal: Negative for abdominal pain, diarrhea, nausea and vomiting. Genitourinary: Negative for dysuria. Musculoskeletal: Negative for neck pain and neck stiffness. Skin: Negative for rash. Neurological: Negative for headaches. All other systems reviewed and are negative. There were no vitals filed for this visit.          Physical Exam   Physical Examination: General appearance - alert, well appearing, and in no distress, oriented to person, place, and time and normal appearing weight  Eyes - pupils equal and reactive, extraocular eye movements intact  Neck - supple, no significant adenopathy  Chest -mild tachypnea, lungs clear but spastic cough with wheezing heard with coughing  Heart - normal rate, regular rhythm, normal S1, S2, no murmurs, rubs, clicks or gallops  Abdomen - soft, nontender, nondistended, no masses or organomegaly  Back exam - full range of motion, no tenderness, palpable spasm or pain on motion  Neurological - alert, oriented, normal speech, no focal findings or movement disorder noted  Musculoskeletal - no joint tenderness, deformity or swelling  Extremities - peripheral pulses normal, no pedal edema, no clubbing or cyanosis  Skin - normal coloration and turgor, no rashes, no suspicious skin lesions noted  MDM  Number of Diagnoses or Management Options     Amount and/or Complexity of Data Reviewed  Clinical lab tests: ordered and reviewed  Tests in the radiology section of CPT®: ordered and reviewed  Independent visualization of images, tracings, or specimens: yes    Patient Progress  Patient progress: improved         Procedures    ED EKG interpretation:  Rhythm: normal sinus rhythm; and regular . Rate (approx.): 69; Axis: normal; P wave: normal; QRS interval: normal ; ST/T wave: normal; t wave inversion III  EKG documented by Jose Carlos Silva MD    Patient afebrile nontoxic. Vital signs stable. Patient complains of productive cough. Breathing and cough improved with treatment in ED. We will discharged with steroids, azithromycin, albuterol. Follow-up with PCP return to ED for worsening symptoms. Oxygen saturations 100% on room air. No increased work of breathing accessory muscle use. Chest x-ray unremarkable.

## 2022-04-12 NOTE — ED TRIAGE NOTES
Patient arrives ambulatory to ED with complaints of chest pain, shortness of breath, and productive cough since Friday     Has been using ibuprofen and cough syrup for attempted relief

## 2022-04-12 NOTE — DISCHARGE INSTRUCTIONS
We hope that we have addressed all of your medical concerns. The examination and treatment you received in the Emergency Department were for an emergent problem and were not intended as complete care. It is important that you follow up with your healthcare provider(s) for ongoing care. If your symptoms worsen or do not improve as expected, and you are unable to reach your usual health care provider(s), you should return to the Emergency Department. Today's healthcare is undergoing tremendous change, and patient satisfaction surveys are one of the many tools to assess the quality of medical care. You may receive a survey from the [employer] organization regarding your experience in the Emergency Department. I hope that your experience has been completely positive, particularly the medical care that I provided. As such, please participate in the survey; anything less than excellent does not meet my expectations or intentions. [facility] and [facility] participate in nationally recognized quality of care measures. If your blood pressure is greater than 120/80, as reported below, we urge that you seek medical care to address the potential of high blood pressure, commonly known as hypertension. Hypertension can be hereditary or can be caused by certain medical conditions, pain, stress, or \"white coat syndrome. \"       Please make an appointment with your health care provider(s) for follow up of your Emergency Department visit. VITALS:   Patient Vitals for the past 8 hrs:   Temp Pulse Resp BP SpO2   04/12/22 0117 -- -- -- -- 100 %   04/12/22 0011 98.4 °F (36.9 °C) 88 20 108/76 99 %          Thank you for allowing us to provide you with medical care today. We realize that you have many choices for your emergency care needs. Please choose us in the future for any continued health care needs. Angel Blair, [address] [facility] [address] [facility].   Office: [phone]            Recent Results (from the past 24 hour(s))   STREP AG SCREEN, GROUP A    Collection Time: 04/12/22 12:20 AM    Specimen: Swab   Result Value Ref Range    Group A Strep Ag ID Negative NEG     INFLUENZA A+B VIRAL AGS    Collection Time: 04/12/22  1:13 AM   Result Value Ref Range    Influenza A Antigen Negative NEG      Influenza B Antigen Negative NEG         XR CHEST PA LAT    Result Date: 4/12/2022  EXAM: XR CHEST PA LAT INDICATION: cough COMPARISON: None. FINDINGS: PA and lateral radiographs of the chest demonstrate clear lungs. The cardiac and mediastinal contours and pulmonary vascularity are normal. The bones and soft tissues are within normal limits.      No acute cardiopulmonary process

## 2022-04-14 LAB
ATRIAL RATE: 69 BPM
BACTERIA SPEC CULT: NORMAL
CALCULATED P AXIS, ECG09: 39 DEGREES
CALCULATED R AXIS, ECG10: 35 DEGREES
CALCULATED T AXIS, ECG11: 12 DEGREES
DIAGNOSIS, 93000: NORMAL
P-R INTERVAL, ECG05: 150 MS
Q-T INTERVAL, ECG07: 410 MS
QRS DURATION, ECG06: 90 MS
QTC CALCULATION (BEZET), ECG08: 439 MS
SERVICE CMNT-IMP: NORMAL
VENTRICULAR RATE, ECG03: 69 BPM

## 2022-04-29 ENCOUNTER — VIRTUAL VISIT (OUTPATIENT)
Dept: FAMILY MEDICINE CLINIC | Age: 27
End: 2022-04-29
Payer: COMMERCIAL

## 2022-04-29 DIAGNOSIS — R10.13 EPIGASTRIC PAIN: ICD-10-CM

## 2022-04-29 DIAGNOSIS — R05.9 COUGH: Primary | ICD-10-CM

## 2022-04-29 PROCEDURE — 99442 PR PHYS/QHP TELEPHONE EVALUATION 11-20 MIN: CPT | Performed by: STUDENT IN AN ORGANIZED HEALTH CARE EDUCATION/TRAINING PROGRAM

## 2022-04-29 RX ORDER — FLUTICASONE PROPIONATE 50 MCG
SPRAY, SUSPENSION (ML) NASAL
Qty: 1 EACH | Refills: 3 | Status: SHIPPED | OUTPATIENT
Start: 2022-04-29

## 2022-04-29 RX ORDER — FAMOTIDINE 20 MG/1
20 TABLET, FILM COATED ORAL 2 TIMES DAILY
Qty: 30 TABLET | Refills: 0 | Status: SHIPPED | OUTPATIENT
Start: 2022-04-29 | End: 2022-05-11

## 2022-04-29 RX ORDER — LORATADINE 10 MG/1
10 TABLET ORAL DAILY
Qty: 30 TABLET | Refills: 3 | Status: SHIPPED | OUTPATIENT
Start: 2022-04-29

## 2022-04-29 NOTE — PROGRESS NOTES
[address] Medicine Residency Attending Addendum:  Dr. Olivia Everett, DO,  the patient and I were not physically present during this encounter. The resident and I are concurrently monitoring the patient care through appropriate telecommunication technology. I discussed the findings, assessment and plan with the resident and agree with the resident's findings and plan as documented in the resident's note.       Kelsey Lobo MD

## 2022-04-29 NOTE — PROGRESS NOTES
Saritha Dominguez  27 y.o. female  1995  [address] [facility]  [id number]    [phone] (home)      [address]:    Telephone Encounter  [facility]       Encounter Date: 4/29/2022 at 1:59 PM    Consent: Saritha Dominguez, who was seen by synchronous (real-time) audio only technology, and/or her healthcare decision maker, is aware that this patient-initiated, Telehealth encounter on 4/29/2022 is a billable service, with coverage as determined by her insurance carrier. She is aware that she may receive a bill and has provided verbal consent to proceed: Yes. Chief Complaint   Patient presents with    Cough     improved since diagnosis of bronchitis on 4/12    Epigastric Pain     on one year duration       History of Present Illness   Saritha Dominguez is a 27 y.o. female was evaluated by telephone. I communicated with the patient and/or health care decision maker about cough, fatigue, abdominal pain. Seen in the ED 4/12 and diagnosed with bronchitis. Received steroids, azithromycin, albuterol. Feels pain above her forehead when she breathes but not in her chest. No longer coughing daily, only a couple days a week, thin white mucus. Not wheezing, no watery eyes. Does endorse scratchy throat sometimes and ongoing fatigue. Throat culture, flu, covid, strep negative on 4/12. CXR normal on 4/12. Also mentions abdominal pain of one year duration located below breast bone. Worse with eating, exacerbated by meats, unsure if spicy foods make it worse. No diarrhea, nausea, or vomiting. Having regular bowel movements. Feels the pain almost daily. Review of Systems   Review of Systems   Constitutional: Negative for chills and fever. HENT: Positive for congestion and sinus pain. Respiratory: Positive for cough (occasional) and sputum production (white thin). Negative for shortness of breath and wheezing.     Gastrointestinal: Positive for abdominal pain (epigastric). Negative for constipation, diarrhea, nausea and vomiting. Vitals/Objective:   General: Patient speaking in complete sentences without effort. Normal speech and cooperative. Due to this being a Virtual Check-in/Telephone evaluation, many elements of the physical examination are unable to be assessed. Assessment and Plan: Total Time: minutes: 11-20 minutes    1. Cough: much improved since ED visit however still present. Possibly exacerbated by allergies, will add medications below. Sp azithromycin and prednisone.  - fluticasone propionate (FLONASE) 50 mcg/actuation nasal spray; 1 spray per nostril two times per day  Dispense: 1 Each; Refill: 3  - loratadine (Claritin) 10 mg tablet; Take 1 Tablet by mouth daily. Dispense: 30 Tablet; Refill: 3    2. Epigastric pain: possibly GERD with midline epigastric location and worsening with eating. Differential includes cholelithiasis. Constipation not likely given regular bowel movements. - famotidine (PEPCID) 20 mg tablet; Take 1 Tablet by mouth two (2) times a day. Dispense: 30 Tablet; Refill: 0  - Will schedule for in office visit for PE     Patient informed to follow up: in one week in office    I affirm this is a Patient Initiated Episode with an Established Patient who has not had a related appointment within my department in the past 7 days or scheduled within the next 24 hours. Note: not billable if this call serves to triage the patient into an appointment for the relevant concern      Electronically Signed: Amy Pelayo DO  Providers location when delivering service: home        ICD-10-CM ICD-9-CM    1. Cough  R05.9 786.2 fluticasone propionate (FLONASE) 50 mcg/actuation nasal spray      loratadine (Claritin) 10 mg tablet   2.  Epigastric pain  R10.13 789.06 famotidine (PEPCID) 20 mg tablet       Pursuant to the emergency declaration under the [facility] waiver authority and the Coronavirus Preparedness and Response Supplemental Appropriations Act, this Virtual  Visit was conducted, with patient's consent, to reduce the patient's risk of exposure to COVID-19 and provide continuity of care for an established patient. History   Patients past medical, surgical and family histories were personally reviewed and updated. History reviewed. No pertinent past medical history. History reviewed. No pertinent surgical history. History reviewed. No pertinent family history. Social History     Tobacco Use    Smoking status: Never Smoker    Smokeless tobacco: Never Used   Substance Use Topics    Alcohol use: Not on file    Drug use: Not on file              Current Medications/Allergies   Medications and Allergies reviewed:    Current Outpatient Medications   Medication Sig Dispense Refill    fluticasone propionate (FLONASE) 50 mcg/actuation nasal spray 1 spray per nostril two times per day 1 Each 3    loratadine (Claritin) 10 mg tablet Take 1 Tablet by mouth daily. 30 Tablet 3    famotidine (PEPCID) 20 mg tablet Take 1 Tablet by mouth two (2) times a day. 30 Tablet 0    albuterol (PROVENTIL HFA, VENTOLIN HFA, PROAIR HFA) 90 mcg/actuation inhaler Take 2 Puffs by inhalation every four (4) hours as needed for Wheezing. 18 g 0    letrozole (FEMARA) 2.5 mg tablet Take 3 Tablets by mouth daily. 15 Tablet 0    baclofen (LIORESAL) 10 mg tablet Take 0.5 Tablets by mouth three (3) times daily. Take half a tablet at night as needed for neck pain. Do not take if you are going to be driving or operating heavy machinery. (Patient not taking: Reported on 1/17/2022) 20 Tablet 0    lidocaine (Lidoderm) 5 % Apply patch to the affected area for 12 hours a day and remove for 12 hours a day.  (Patient not taking: Reported on 7/23/2021) 5 Each 0     No Known Allergies

## 2022-05-11 ENCOUNTER — OFFICE VISIT (OUTPATIENT)
Dept: FAMILY MEDICINE CLINIC | Age: 27
End: 2022-05-11
Payer: COMMERCIAL

## 2022-05-11 VITALS
OXYGEN SATURATION: 98 % | WEIGHT: 195.6 LBS | HEART RATE: 79 BPM | BODY MASS INDEX: 35.99 KG/M2 | RESPIRATION RATE: 18 BRPM | SYSTOLIC BLOOD PRESSURE: 117 MMHG | HEIGHT: 62 IN | DIASTOLIC BLOOD PRESSURE: 68 MMHG

## 2022-05-11 DIAGNOSIS — N92.6 IRREGULAR MENSES: ICD-10-CM

## 2022-05-11 DIAGNOSIS — R10.84 ABDOMINAL PAIN, GENERALIZED: ICD-10-CM

## 2022-05-11 DIAGNOSIS — R10.13 EPIGASTRIC PAIN: Primary | ICD-10-CM

## 2022-05-11 DIAGNOSIS — K21.9 GASTROESOPHAGEAL REFLUX DISEASE, UNSPECIFIED WHETHER ESOPHAGITIS PRESENT: ICD-10-CM

## 2022-05-11 PROBLEM — K21.00 GASTROESOPHAGEAL REFLUX DISEASE WITH ESOPHAGITIS: Status: ACTIVE | Noted: 2022-05-11

## 2022-05-11 PROBLEM — N91.2 AMENORRHEA: Status: ACTIVE | Noted: 2022-05-11

## 2022-05-11 PROBLEM — F32.1 MODERATE MAJOR DEPRESSION, SINGLE EPISODE (HCC): Status: ACTIVE | Noted: 2022-05-11

## 2022-05-11 PROBLEM — N93.9 ABNORMAL UTERINE BLEEDING: Status: ACTIVE | Noted: 2022-05-11

## 2022-05-11 PROBLEM — F41.9 ANXIETY: Status: ACTIVE | Noted: 2022-05-11

## 2022-05-11 PROBLEM — E66.9 OBESITY: Status: ACTIVE | Noted: 2022-05-11

## 2022-05-11 PROBLEM — F41.1 GENERALIZED ANXIETY DISORDER: Status: ACTIVE | Noted: 2022-05-11

## 2022-05-11 PROBLEM — G93.32 CHRONIC FATIGUE SYNDROME: Status: ACTIVE | Noted: 2022-05-11

## 2022-05-11 PROBLEM — G47.00 INSOMNIA: Status: ACTIVE | Noted: 2022-05-11

## 2022-05-11 LAB
BILIRUB UR QL STRIP: NEGATIVE
GLUCOSE UR-MCNC: NEGATIVE MG/DL
HCG URINE, QL. (POC): NEGATIVE
KETONES P FAST UR STRIP-MCNC: NEGATIVE MG/DL
PH UR STRIP: 5.5 [PH] (ref 4.6–8)
PROT UR QL STRIP: NEGATIVE
SP GR UR STRIP: 1.02 (ref 1–1.03)
UA UROBILINOGEN AMB POC: NORMAL (ref 0.2–1)
URINALYSIS CLARITY POC: CLEAR
URINALYSIS COLOR POC: YELLOW
URINE BLOOD POC: NEGATIVE
URINE LEUKOCYTES POC: NEGATIVE
URINE NITRITES POC: NEGATIVE
VALID INTERNAL CONTROL?: YES

## 2022-05-11 PROCEDURE — 99213 OFFICE O/P EST LOW 20 MIN: CPT | Performed by: STUDENT IN AN ORGANIZED HEALTH CARE EDUCATION/TRAINING PROGRAM

## 2022-05-11 PROCEDURE — 81025 URINE PREGNANCY TEST: CPT | Performed by: STUDENT IN AN ORGANIZED HEALTH CARE EDUCATION/TRAINING PROGRAM

## 2022-05-11 PROCEDURE — 81003 URINALYSIS AUTO W/O SCOPE: CPT | Performed by: STUDENT IN AN ORGANIZED HEALTH CARE EDUCATION/TRAINING PROGRAM

## 2022-05-11 RX ORDER — PANTOPRAZOLE SODIUM 20 MG/1
20 TABLET, DELAYED RELEASE ORAL DAILY
Qty: 30 TABLET | Refills: 0 | Status: SHIPPED | OUTPATIENT
Start: 2022-05-11

## 2022-05-11 NOTE — PATIENT INSTRUCTIONS
Enfermedad de reflujo gastroesofágico (GERD): Instrucciones de cuidado  Gastroesophageal Reflux Disease (GERD): Care Instructions  Instrucciones de cuidado     La enfermedad por reflujo gastroesofágico (ERGE) es cuando el ácido estomacal fluye de vuelta al esófago. El esófago es el tubo que va desde la garganta al [facility]. Adry Josh de rachell yayo vía jermaine que el ácido estomacal fluya de vuelta por ramona tubo. Cuando usted tiene [id number]-[id number], esta válvula no se christian herméticamente. [place] también puede causar dolor e hinchazón en el esófago (esofagitis). Si tiene síntomas leves de ERGE, johanna acidez [place], es posible que pueda controlar el problema con antiácidos o medicamentos de [facility]. Cambiar la dieta y los hábitos de alimentación, johanna no comer tarde por la noche, bajar de peso y hacer otros cambios en el estilo de sandie también puede ayudar a [employer]. La atención de seguimiento es rachell parte clave de iverson tratamiento y seguridad. Asegúrese de hacer y acudir a todas las citas, y llame a iverson médico si está teniendo problemas. También es rachell buena idea saber los resultados de maurisio exámenes y mantener rachell lista de los medicamentos que chanell. ¿Cómo puede cuidarse en el hogar? · [employer] medicamentos exactamente johanna le fueron recetados. Llame a iverson médico si melissa estar teniendo problemas con iverson medicamento. · El médico podría recomendarle medicamentos de [employer]. Para la indigestión leve u ocasional, los antiácidos, johanna Tums, Gaviscon, Mylanta o Maalox, pueden ayudar. El médico también puede recomendar reductores de ácido de venta jabari, johanna famotidina (Pepcid AC), cimetidina (Tagamet HB) u omeprazol (Prilosec). Maribel y siga todas las indicaciones de la [place]. [place] estos medicamentos a menudo, hable con [employer]  · Cambie maurisio hábitos alimentarios. ? Es mejor comer varias comidas pequeñas en lugar de dos o gaudencio comidas abundantes. ?  Después de comer, espere de 2 a 3 horas antes de recostarse. ? Evite alimentos que empeoran tatyana síntomas. Estos pueden incluir chocolate, menta, alcohol, angela, alimentos picantes, alimentos ricos en grasas o bebidas con cafeína, johanna té, café, refrescos de cola o bebidas energéticas. Si los síntomas empeoran después de comer un determinado alimento, se recomienda que deje de comerlo para toan si mejoran los síntomas. · No fume ni masque tabaco. Fumar puede agravar la GERD. Si necesita ayuda para dejar de usar tabaco, hable con iverson médico sobre programas y medicamentos para dejar de usarlo. Estos pueden aumentar tatyana probabilidades de dejar el hábito para siempre. · Si tiene síntomas de GERD arjun la noche, eleve la cabecera de iverson cama entre 6 y 6 pulgadas (entre 15 y 20 cm) colocando ladrillos debajo de la estructura de la cama o rachell cuña de espuma debajo de la cabecera del colchón. (Usar almohadas adicionales no funciona). · No use ropa ajustada alrededor [employer] del cuerpo. · Baje de peso, si necesita hacerlo. Perder tan solo entre 5 y 10 libras (2.3 a 4.5 kg) puede ayudarle. ¿Cuándo debe pedir ayuda? Llame a iverson médico ahora mismo o busque atención médica inmediata si:    · Tiene un dolor de estómago nuevo o distinto.     · Tatyana heces son negruzcas y parecidas al alquitrán o tienen rastros de [place]. Preste especial atención a los cambios en iverson perez y asegúrese de comunicarse con iverson médico si:    · Tatyana síntomas no [place] después de 2 días.     · La comida parece quedarle atorada en la garganta o el pecho. ¿Dónde puede encontrar más información en inglés? Tony Dennis a http://www.gray.248 [employer]/  Enrique L711 en la búsqueda para aprender más acerca de \"Enfermedad de reflujo gastroesofágico (GERD): Instrucciones de cuidado. \"  Revisado: 8 septiembre, 2021               Versión del contenido: 13.2  © 4762-3525 Healthwise, Incorporated.    Las instrucciones de cuidado fueron adaptadas bajo licencia por [employer] (which disclaims liability or warranty for this information). Si usted tiene [place] afección médica o sobre estas instrucciones, siempre pregunte a iverson profesional de perez. [facility] niega toda garantía o responsabilidad por iverson uso de esta información.

## 2022-05-11 NOTE — PROGRESS NOTES
Pia Muniz is a 27 y.o. female    Used  #[id number]    Chief Complaint   Patient presents with    Abdominal Pain     epigastric pain. also has pain when having a BM, does not go every day.  Cough     diagnosed with bronchitis 4 weeks ago. still has cough and phlegm. 1. \"Have you been to the ER, urgent care clinic since your last visit? Hospitalized since your last visit? \" No    2. \"Have you seen or consulted any other health care providers outside of the [address] [place] [address] since your last visit? \" No     3. For patients aged 39-70: Has the patient had a colonoscopy / FIT/ Cologuard? NA - based on age      If the patient is female:    4. For patients aged 41-77: Has the patient had a mammogram within the past 2 years? NA - based on age or sex      11. For patients aged 21-65: Has the patient had a pap smear? Yes - no Care Gap present    Vitals:    05/11/22 0959   BP: 117/68   Pulse: 79   Resp: 18   Height: 5' 2\" (1.575 m)   Weight: 195 lb 9.6 oz (88.7 kg)   SpO2: 98%             Health Maintenance Due   Topic Date Due    Hepatitis C Screening  Never done    Depression Screen  Never done    COVID-19 Vaccine (1) Never done    DTaP/Tdap/Td series (1 - Tdap) Never done         Medication Reconciliation completed, changes noted.   Please update medication list.

## 2022-05-11 NOTE — PROGRESS NOTES
Subjective   CC:  Bryson Parsons is a 27 y.o. female who presents for abd pain    This visit was performed with use of [employer],  [place] [id number]. Abdominal pain:  - Present for past year  - Most pain is under breast bone, describes as a pressure like sensation with radiation to back  - Worse with spicy and fried foods, has tried to eat more bland foods such as salads, fish filets, but still experiences pain  - Accompanied by bloating, no n/v/c/d, fevers, chills, dysphagia  - Same after taking Pepcid x 1 week  - Reports she has adequate hydration, only drinks water, no sodas  - Reports having a night time cough  - BMs are typically every day to every other day - no regular pattern that she can recall      Review of Systems   Constitutional: Negative for activity change, chills and fever. HENT: Negative for congestion, sore throat and trouble swallowing. Respiratory: Negative for cough, chest tightness and shortness of breath. Cardiovascular: Negative for chest pain and leg swelling. Gastrointestinal: Positive for abdominal distention and abdominal pain. Negative for constipation, diarrhea, nausea and vomiting. Endocrine: Negative for cold intolerance, heat intolerance, polydipsia, polyphagia and polyuria. Musculoskeletal: Negative for myalgias. Skin: Negative for rash. Neurological: Negative for dizziness, weakness and headaches. Past Medical History  No past medical history on file. Current Medications  Current Outpatient Medications   Medication Sig Dispense Refill    pantoprazole (PROTONIX) 20 mg tablet Take 1 Tablet by mouth daily. Take 30 minutes before other meds and prior to eating 30 Tablet 0    loratadine (Claritin) 10 mg tablet Take 1 Tablet by mouth daily. 30 Tablet 3    lidocaine (Lidoderm) 5 % Apply patch to the affected area for 12 hours a day and remove for 12 hours a day.  5 Each 0    fluticasone propionate (FLONASE) 50 mcg/actuation nasal spray 1 spray per nostril two times per day (Patient not taking: Reported on 5/11/2022) 1 Each 3       Allergies  No Known Allergies    Social History   Social History     Socioeconomic History    Marital status: SINGLE     Spouse name: Not on file    Number of children: Not on file    Years of education: Not on file    Highest education level: Not on file   Occupational History    Not on file   Tobacco Use    Smoking status: Never Smoker    Smokeless tobacco: Never Used   Substance and Sexual Activity    Alcohol use: Not on file    Drug use: Not on file    Sexual activity: Not on file   Other Topics Concern    Not on file   Social History Narrative    Not on file     Social Determinants of Health     Financial Resource Strain:     Difficulty of Paying Living Expenses: Not on file   Food Insecurity:     Worried About Running Out of Food in the Last Year: Not on file    Kenji of Food in the Last Year: Not on file   Transportation Needs:     Lack of Transportation (Medical): Not on file    Lack of Transportation (Non-Medical):  Not on file   Physical Activity:     Days of Exercise per Week: Not on file    Minutes of Exercise per Session: Not on file   Stress:     Feeling of Stress : Not on file   Social Connections:     Frequency of Communication with Friends and Family: Not on file    Frequency of Social Gatherings with Friends and Family: Not on file    Attends Amish Services: Not on file    Active Member of Clubs or Organizations: Not on file    Attends Club or Organization Meetings: Not on file    Marital Status: Not on file   Intimate Partner Violence:     Fear of Current or Ex-Partner: Not on file    Emotionally Abused: Not on file    Physically Abused: Not on file    Sexually Abused: Not on file   Housing Stability:     Unable to Pay for Housing in the Last Year: Not on file    Number of [place] in the Last Year: Not on file    Unstable Housing in the Last Year: Not on file       Family History  No family history on file. Objective   Vital Signs  Visit Vitals  /68   Pulse 79   Resp 18   Ht 5' 2\" (1.575 m)   Wt 195 lb 9.6 oz (88.7 kg)   LMP 10/05/2021   SpO2 98%   BMI 35.78 kg/m²       Physical Examination  Physical Exam  Constitutional:       Appearance: Normal appearance. HENT:      Head: Normocephalic and atraumatic. Eyes:      Conjunctiva/sclera: Conjunctivae normal.   Cardiovascular:      Rate and Rhythm: Normal rate and regular rhythm. Pulmonary:      Effort: Pulmonary effort is normal.      Breath sounds: Normal breath sounds. Abdominal:      General: Abdomen is flat. Bowel sounds are normal. There is no distension. Palpations: Abdomen is soft. There is no mass. Tenderness: There is abdominal tenderness (TTP over epigastrium and RUQ). There is no rebound. Hernia: No hernia is present. Comments: TTP over right upper quadrant, worse w inspiration - positive Patten's sign   Musculoskeletal:         General: Normal range of motion. Cervical back: Normal range of motion and neck supple. Skin:     General: Skin is warm. Neurological:      General: No focal deficit present. Mental Status: She is alert and oriented to person, place, and time. Assessment and Plan   Corinne Gaines is a 27 y.o. who is here for abdominal pain. Her pain represents both GERD and biliary colic. Given a mixed picture, will change acid reducing med to PPI and will test for H.Pylori. In the mean time, will also look for hepatobiliary causes to her pain with ultrasound. Negative UPT today, UA w/o evidence of UTI. 1. Epigastric pain  - AMB POC URINE PREGNANCY TEST, VISUAL COLOR COMPARISON  - H PYLORI AG, STOOL; Future    2. Gastroesophageal reflux disease, unspecified whether esophagitis present  - pantoprazole (PROTONIX) 20 mg tablet; Take 1 Tablet by mouth daily.  Take 30 minutes before other meds and prior to eating  Dispense: 30 Tablet; Refill: 0    3. Abdominal pain, generalized  - AMB POC URINE PREGNANCY TEST, VISUAL COLOR COMPARISON  - AMB POC URINALYSIS DIP STICK AUTO W/O MICRO  -  ABD LTD; Future    4. Irregular menses  - AMB POC URINALYSIS DIP STICK AUTO W/O MICRO       RTC 2-4 wks for follow up abdominal pain    Patient is counseled to return to the office if symptoms do not improve as expected. Urgent consultation with the nearest Emergency Department is strongly recommended if condition worsens. Patient is counseled to follow up as recommended and to inform the office if any changes in treatment are recommended.       I discussed this patient with Dr. Kami Ramey (Attending Physician)       Signed By:  Yogesh Larry MD  Family Medicine Resident

## 2022-05-17 ENCOUNTER — HOSPITAL ENCOUNTER (OUTPATIENT)
Dept: ULTRASOUND IMAGING | Age: 27
Discharge: HOME | End: 2022-05-17
Attending: STUDENT IN AN ORGANIZED HEALTH CARE EDUCATION/TRAINING PROGRAM
Payer: COMMERCIAL

## 2022-05-17 ENCOUNTER — LAB ONLY (OUTPATIENT)
Dept: FAMILY MEDICINE CLINIC | Age: 27
End: 2022-05-17

## 2022-05-17 DIAGNOSIS — R10.84 ABDOMINAL PAIN, GENERALIZED: ICD-10-CM

## 2022-05-17 DIAGNOSIS — R10.13 EPIGASTRIC PAIN: ICD-10-CM

## 2022-05-17 PROCEDURE — 76705 ECHO EXAM OF ABDOMEN: CPT

## 2022-05-19 LAB — H PYLORI AG STL QL IA: NEGATIVE

## 2022-05-20 ENCOUNTER — TELEPHONE (OUTPATIENT)
Dept: FAMILY MEDICINE CLINIC | Age: 27
End: 2022-05-20

## 2022-05-20 DIAGNOSIS — K80.20 CALCULUS OF GALLBLADDER WITHOUT CHOLECYSTITIS WITHOUT OBSTRUCTION: Primary | ICD-10-CM

## 2022-05-20 DIAGNOSIS — R10.11 RUQ PAIN: ICD-10-CM

## 2022-05-20 NOTE — TELEPHONE ENCOUNTER
This visit was performed with use of [employer]  [place] [id number]. D/w patient negative H.pylori test but that U/s abdomen showed cholelithiasis and pt with positive sonographic Patten sign. Pt states she has continued to have painful RUQ pain with back pain, especially at night. We discussed options for conservative vs surgical management of her cholelithiasis. Due to her level of discomfort from the abdominal pain, she is interested in undergoing cholecystectomy. Referral placed to Dr. Rosemary Hooker and pt provided with Dr. Mag Jones office number. All questions answered.      Darek Dunne MD  [facility] Family Medicine Resident

## 2022-05-24 ENCOUNTER — TELEPHONE (OUTPATIENT)
Dept: FAMILY MEDICINE CLINIC | Age: 27
End: 2022-05-24

## 2022-05-24 NOTE — TELEPHONE ENCOUNTER
----- Message from Nuris Helton sent at 5/23/2022 12:01 PM EDT -----  Subject: Message to Provider    QUESTIONS  Information for Provider? PT was returning the call from the office. ECC   tried to call many time with no answer. Please call PT to discuss   ---------------------------------------------------------------------------  --------------  CALL BACK INFO  What is the best way for the office to contact you? OK to leave message on   voicemail  Preferred Call Back Phone Number? [phone]  ---------------------------------------------------------------------------  --------------  SCRIPT ANSWERS  Relationship to Patient?  Self

## 2022-06-14 ENCOUNTER — APPOINTMENT (OUTPATIENT)
Dept: ULTRASOUND IMAGING | Age: 27
End: 2022-06-14
Attending: EMERGENCY MEDICINE
Payer: COMMERCIAL

## 2022-06-14 ENCOUNTER — HOSPITAL ENCOUNTER (OUTPATIENT)
Age: 27
Setting detail: OUTPATIENT SURGERY
Discharge: HOME | End: 2022-06-15
Attending: EMERGENCY MEDICINE | Admitting: SURGERY
Payer: COMMERCIAL

## 2022-06-14 DIAGNOSIS — G89.18 POST-OPERATIVE PAIN: Primary | ICD-10-CM

## 2022-06-14 PROBLEM — K80.00 CALCULUS OF GALLBLADDER WITH ACUTE CHOLECYSTITIS: Status: ACTIVE | Noted: 2022-06-14

## 2022-06-14 LAB
ALBUMIN SERPL-MCNC: 3.6 G/DL (ref 3.5–5)
ALBUMIN/GLOB SERPL: 0.8 {RATIO} (ref 1.1–2.2)
ALP SERPL-CCNC: 66 U/L (ref 45–117)
ALT SERPL-CCNC: 51 U/L (ref 12–78)
ANION GAP SERPL CALC-SCNC: 6 MMOL/L (ref 5–15)
APPEARANCE UR: ABNORMAL
AST SERPL-CCNC: 26 U/L (ref 15–37)
BACTERIA URNS QL MICRO: ABNORMAL /HPF
BASOPHILS # BLD: 0 K/UL (ref 0–0.1)
BASOPHILS NFR BLD: 0 % (ref 0–1)
BILIRUB SERPL-MCNC: 0.5 MG/DL (ref 0.2–1)
BILIRUB UR QL: NEGATIVE
BUN SERPL-MCNC: 15 MG/DL (ref 6–20)
BUN/CREAT SERPL: 13 (ref 12–20)
CALCIUM SERPL-MCNC: 9.2 MG/DL (ref 8.5–10.1)
CHLORIDE SERPL-SCNC: 108 MMOL/L (ref 97–108)
CO2 SERPL-SCNC: 26 MMOL/L (ref 21–32)
COLOR UR: ABNORMAL
CREAT SERPL-MCNC: 1.16 MG/DL (ref 0.55–1.02)
DIFFERENTIAL METHOD BLD: ABNORMAL
EOSINOPHIL # BLD: 0.1 K/UL (ref 0–0.4)
EOSINOPHIL NFR BLD: 1 % (ref 0–7)
EPITH CASTS URNS QL MICRO: ABNORMAL /LPF
ERYTHROCYTE [DISTWIDTH] IN BLOOD BY AUTOMATED COUNT: 12.2 % (ref 11.5–14.5)
GLOBULIN SER CALC-MCNC: 4.4 G/DL (ref 2–4)
GLUCOSE SERPL-MCNC: 154 MG/DL (ref 65–100)
GLUCOSE UR STRIP.AUTO-MCNC: NEGATIVE MG/DL
HCT VFR BLD AUTO: 37.9 % (ref 35–47)
HGB BLD-MCNC: 12.8 G/DL (ref 11.5–16)
HGB UR QL STRIP: NEGATIVE
HYALINE CASTS URNS QL MICRO: ABNORMAL /LPF (ref 0–2)
IMM GRANULOCYTES # BLD AUTO: 0 K/UL (ref 0–0.04)
IMM GRANULOCYTES NFR BLD AUTO: 0 % (ref 0–0.5)
KETONES UR QL STRIP.AUTO: ABNORMAL MG/DL
LEUKOCYTE ESTERASE UR QL STRIP.AUTO: ABNORMAL
LIPASE SERPL-CCNC: 154 U/L (ref 73–393)
LYMPHOCYTES # BLD: 1.9 K/UL (ref 0.8–3.5)
LYMPHOCYTES NFR BLD: 26 % (ref 12–49)
MCH RBC QN AUTO: 26.4 PG (ref 26–34)
MCHC RBC AUTO-ENTMCNC: 33.8 G/DL (ref 30–36.5)
MCV RBC AUTO: 78.3 FL (ref 80–99)
MONOCYTES # BLD: 0.5 K/UL (ref 0–1)
MONOCYTES NFR BLD: 7 % (ref 5–13)
NEUTS SEG # BLD: 4.9 K/UL (ref 1.8–8)
NEUTS SEG NFR BLD: 66 % (ref 32–75)
NITRITE UR QL STRIP.AUTO: NEGATIVE
NRBC # BLD: 0 K/UL (ref 0–0.01)
NRBC BLD-RTO: 0 PER 100 WBC
PH UR STRIP: 5.5 [PH] (ref 5–8)
PLATELET # BLD AUTO: 306 K/UL (ref 150–400)
PMV BLD AUTO: 10.3 FL (ref 8.9–12.9)
POTASSIUM SERPL-SCNC: 3.9 MMOL/L (ref 3.5–5.1)
PROT SERPL-MCNC: 8 G/DL (ref 6.4–8.2)
PROT UR STRIP-MCNC: NEGATIVE MG/DL
RBC # BLD AUTO: 4.84 M/UL (ref 3.8–5.2)
RBC #/AREA URNS HPF: ABNORMAL /HPF (ref 0–5)
SODIUM SERPL-SCNC: 140 MMOL/L (ref 136–145)
SP GR UR REFRACTOMETRY: 1.02 (ref 1–1.03)
UR CULT HOLD, URHOLD: NORMAL
UROBILINOGEN UR QL STRIP.AUTO: 1 EU/DL (ref 0.2–1)
WBC # BLD AUTO: 7.4 K/UL (ref 3.6–11)
WBC URNS QL MICRO: >100 /HPF (ref 0–4)

## 2022-06-14 PROCEDURE — 96372 THER/PROPH/DIAG INJ SC/IM: CPT

## 2022-06-14 PROCEDURE — 96374 THER/PROPH/DIAG INJ IV PUSH: CPT

## 2022-06-14 PROCEDURE — 36415 COLL VENOUS BLD VENIPUNCTURE: CPT

## 2022-06-14 PROCEDURE — 80053 COMPREHEN METABOLIC PANEL: CPT

## 2022-06-14 PROCEDURE — 76705 ECHO EXAM OF ABDOMEN: CPT

## 2022-06-14 PROCEDURE — 81001 URINALYSIS AUTO W/SCOPE: CPT

## 2022-06-14 PROCEDURE — 74011000250 HC RX REV CODE- 250: Performed by: SURGERY

## 2022-06-14 PROCEDURE — 74011250636 HC RX REV CODE- 250/636: Performed by: EMERGENCY MEDICINE

## 2022-06-14 PROCEDURE — 99285 EMERGENCY DEPT VISIT HI MDM: CPT

## 2022-06-14 PROCEDURE — 83690 ASSAY OF LIPASE: CPT

## 2022-06-14 PROCEDURE — 96375 TX/PRO/DX INJ NEW DRUG ADDON: CPT

## 2022-06-14 PROCEDURE — 74011250636 HC RX REV CODE- 250/636: Performed by: SURGERY

## 2022-06-14 PROCEDURE — 85025 COMPLETE CBC W/AUTO DIFF WBC: CPT

## 2022-06-14 PROCEDURE — 99218 PR INITIAL OBSERVATION CARE/DAY 30 MINUTES: CPT | Performed by: SURGERY

## 2022-06-14 PROCEDURE — G0378 HOSPITAL OBSERVATION PER HR: HCPCS

## 2022-06-14 RX ORDER — ENOXAPARIN SODIUM 100 MG/ML
40 INJECTION SUBCUTANEOUS EVERY 24 HOURS
Status: DISCONTINUED | OUTPATIENT
Start: 2022-06-14 | End: 2022-06-15 | Stop reason: HOSPADM

## 2022-06-14 RX ORDER — HYDROMORPHONE HYDROCHLORIDE 1 MG/ML
1 INJECTION, SOLUTION INTRAMUSCULAR; INTRAVENOUS; SUBCUTANEOUS
Status: DISCONTINUED | OUTPATIENT
Start: 2022-06-14 | End: 2022-06-15 | Stop reason: HOSPADM

## 2022-06-14 RX ORDER — DEXTROSE, SODIUM CHLORIDE, AND POTASSIUM CHLORIDE 5; .45; .15 G/100ML; G/100ML; G/100ML
125 INJECTION INTRAVENOUS CONTINUOUS
Status: DISCONTINUED | OUTPATIENT
Start: 2022-06-14 | End: 2022-06-15 | Stop reason: HOSPADM

## 2022-06-14 RX ORDER — SODIUM CHLORIDE 0.9 % (FLUSH) 0.9 %
5-40 SYRINGE (ML) INJECTION EVERY 8 HOURS
Status: DISCONTINUED | OUTPATIENT
Start: 2022-06-14 | End: 2022-06-15 | Stop reason: HOSPADM

## 2022-06-14 RX ORDER — SODIUM CHLORIDE 0.9 % (FLUSH) 0.9 %
5-40 SYRINGE (ML) INJECTION AS NEEDED
Status: DISCONTINUED | OUTPATIENT
Start: 2022-06-14 | End: 2022-06-15 | Stop reason: HOSPADM

## 2022-06-14 RX ORDER — KETOROLAC TROMETHAMINE 30 MG/ML
30 INJECTION, SOLUTION INTRAMUSCULAR; INTRAVENOUS
Status: COMPLETED | OUTPATIENT
Start: 2022-06-14 | End: 2022-06-14

## 2022-06-14 RX ORDER — ONDANSETRON 2 MG/ML
4 INJECTION INTRAMUSCULAR; INTRAVENOUS
Status: COMPLETED | OUTPATIENT
Start: 2022-06-14 | End: 2022-06-14

## 2022-06-14 RX ORDER — METRONIDAZOLE 500 MG/100ML
500 INJECTION, SOLUTION INTRAVENOUS EVERY 8 HOURS
Status: DISCONTINUED | OUTPATIENT
Start: 2022-06-14 | End: 2022-06-15 | Stop reason: HOSPADM

## 2022-06-14 RX ORDER — ONDANSETRON 2 MG/ML
4 INJECTION INTRAMUSCULAR; INTRAVENOUS
Status: DISCONTINUED | OUTPATIENT
Start: 2022-06-14 | End: 2022-06-15 | Stop reason: HOSPADM

## 2022-06-14 RX ADMIN — ONDANSETRON 4 MG: 2 INJECTION INTRAMUSCULAR; INTRAVENOUS at 19:19

## 2022-06-14 RX ADMIN — ENOXAPARIN SODIUM 40 MG: 100 INJECTION SUBCUTANEOUS at 22:40

## 2022-06-14 RX ADMIN — CEFEPIME HYDROCHLORIDE 2 G: 2 INJECTION, POWDER, FOR SOLUTION INTRAVENOUS at 22:42

## 2022-06-14 RX ADMIN — HYDROMORPHONE HYDROCHLORIDE 1 MG: 1 INJECTION, SOLUTION INTRAMUSCULAR; INTRAVENOUS; SUBCUTANEOUS at 22:41

## 2022-06-14 RX ADMIN — METRONIDAZOLE 500 MG: 500 INJECTION, SOLUTION INTRAVENOUS at 22:49

## 2022-06-14 RX ADMIN — KETOROLAC TROMETHAMINE 30 MG: 30 INJECTION, SOLUTION INTRAMUSCULAR at 19:18

## 2022-06-14 RX ADMIN — Medication 10 ML: at 22:00

## 2022-06-14 NOTE — ED TRIAGE NOTES
Pt reports epigastric abd pain worsening today. Recently dx with gallstones. Pt reports nausea and vomiting. Reports red color to urine.

## 2022-06-14 NOTE — ED PROVIDER NOTES
The history is provided by the patient. Abdominal Pain   This is a new problem. The current episode started 3 to 5 hours ago. The problem occurs constantly. The problem has not changed since onset. The pain is associated with vomiting. The pain is located in the epigastric region and RUQ. The pain is severe. Associated symptoms include anorexia, nausea, vomiting, constipation, hematuria and back pain. Pertinent negatives include no fever, no diarrhea, no dysuria and no frequency. The pain is worsened by palpation, certain positions and eating. The pain is relieved by nothing. Past workup includes ultrasound. Her past medical history is significant for gallstones. The patient's surgical history non-contributory. No past medical history on file. No past surgical history on file. No family history on file. Social History     Socioeconomic History    Marital status: SINGLE     Spouse name: Not on file    Number of children: Not on file    Years of education: Not on file    Highest education level: Not on file   Occupational History    Not on file   Tobacco Use    Smoking status: Never Smoker    Smokeless tobacco: Never Used   Substance and Sexual Activity    Alcohol use: Not on file    Drug use: Not on file    Sexual activity: Not on file   Other Topics Concern    Not on file   Social History Narrative    Not on file     Social Determinants of Health     Financial Resource Strain:     Difficulty of Paying Living Expenses: Not on file   Food Insecurity:     Worried About Running Out of Food in the Last Year: Not on file    Kenji of Food in the Last Year: Not on file   Transportation Needs:     Lack of Transportation (Medical): Not on file    Lack of Transportation (Non-Medical):  Not on file   Physical Activity:     Days of Exercise per Week: Not on file    Minutes of Exercise per Session: Not on file   Stress:     Feeling of Stress : Not on file   Social Connections:     Frequency of Communication with Friends and Family: Not on file    Frequency of Social Gatherings with Friends and Family: Not on file    Attends [employer] Services: Not on file    Active Member of Clubs or Organizations: Not on file    Attends Club or Organization Meetings: Not on file    Marital Status: Not on file   Intimate Partner Violence:     Fear of Current or Ex-Partner: Not on file    Emotionally Abused: Not on file    Physically Abused: Not on file    Sexually Abused: Not on file   Housing Stability:     Unable to Pay for Housing in the Last Year: Not on file    Number of [place] in the Last Year: Not on file    Unstable Housing in the Last Year: Not on file         ALLERGIES: Patient has no known allergies. Review of Systems   Constitutional: Negative for chills and fever. Gastrointestinal: Positive for abdominal pain, anorexia, constipation, nausea and vomiting. Negative for diarrhea. Genitourinary: Positive for hematuria. Negative for dysuria and frequency. Musculoskeletal: Positive for back pain. Neurological: Negative for dizziness and light-headedness. All other systems reviewed and are negative. Vitals:    06/14/22 1533   BP: 125/79   Pulse: 82   Resp: 16   Temp: 98.3 °F (36.8 °C)   SpO2: 99%   Weight: 97.5 kg (215 lb)   Height: 5' 5\" (1.651 m)            Physical Exam  Vitals and nursing note reviewed. Constitutional:       Appearance: She is well-developed. She is obese. HENT:      Head: Normocephalic and atraumatic. Eyes:      Pupils: Pupils are equal, round, and reactive to light. Cardiovascular:      Rate and Rhythm: Normal rate and regular rhythm. Pulmonary:      Effort: Pulmonary effort is normal.      Breath sounds: Normal breath sounds. Abdominal:      General: There is no distension. Palpations: Abdomen is soft. Tenderness: There is abdominal tenderness in the epigastric area. There is right CVA tenderness and left CVA tenderness.  Positive signs include Patten's sign. Musculoskeletal:      Cervical back: Normal range of motion and neck supple. Skin:     General: Skin is warm and dry. Capillary Refill: Capillary refill takes less than 2 seconds. Neurological:      General: No focal deficit present. Mental Status: She is alert and oriented to person, place, and time. Psychiatric:         Mood and Affect: Mood normal.         Behavior: Behavior normal.          MDM       Procedures      Patient is being admitted to the hospital.  The results of their tests and reasons for their admission have been discussed with them and/or available family. They convey agreement and understanding for the need to be admitted and for their admission diagnosis. Consultation will be made now with the inpatient physician for hospitalization.

## 2022-06-15 ENCOUNTER — ANESTHESIA EVENT (OUTPATIENT)
Dept: SURGERY | Age: 27
End: 2022-06-15
Payer: COMMERCIAL

## 2022-06-15 ENCOUNTER — ANESTHESIA (OUTPATIENT)
Dept: SURGERY | Age: 27
End: 2022-06-15
Payer: COMMERCIAL

## 2022-06-15 VITALS
TEMPERATURE: 97.8 F | BODY MASS INDEX: 35.82 KG/M2 | RESPIRATION RATE: 15 BRPM | OXYGEN SATURATION: 100 % | SYSTOLIC BLOOD PRESSURE: 113 MMHG | HEIGHT: 65 IN | HEART RATE: 62 BPM | WEIGHT: 215 LBS | DIASTOLIC BLOOD PRESSURE: 57 MMHG

## 2022-06-15 LAB
ANION GAP SERPL CALC-SCNC: 6 MMOL/L (ref 5–15)
BASOPHILS # BLD: 0 K/UL (ref 0–0.1)
BASOPHILS NFR BLD: 0 % (ref 0–1)
BUN SERPL-MCNC: 15 MG/DL (ref 6–20)
BUN/CREAT SERPL: 17 (ref 12–20)
CALCIUM SERPL-MCNC: 7.7 MG/DL (ref 8.5–10.1)
CHLORIDE SERPL-SCNC: 110 MMOL/L (ref 97–108)
CO2 SERPL-SCNC: 23 MMOL/L (ref 21–32)
CREAT SERPL-MCNC: 0.88 MG/DL (ref 0.55–1.02)
DIFFERENTIAL METHOD BLD: ABNORMAL
EOSINOPHIL # BLD: 0.2 K/UL (ref 0–0.4)
EOSINOPHIL NFR BLD: 2 % (ref 0–7)
ERYTHROCYTE [DISTWIDTH] IN BLOOD BY AUTOMATED COUNT: 12.1 % (ref 11.5–14.5)
GLUCOSE SERPL-MCNC: 247 MG/DL (ref 65–100)
HCG UR QL: NEGATIVE
HCT VFR BLD AUTO: 32 % (ref 35–47)
HGB BLD-MCNC: 10.6 G/DL (ref 11.5–16)
IMM GRANULOCYTES # BLD AUTO: 0 K/UL (ref 0–0.04)
IMM GRANULOCYTES NFR BLD AUTO: 0 % (ref 0–0.5)
LYMPHOCYTES # BLD: 2.9 K/UL (ref 0.8–3.5)
LYMPHOCYTES NFR BLD: 40 % (ref 12–49)
MCH RBC QN AUTO: 26.3 PG (ref 26–34)
MCHC RBC AUTO-ENTMCNC: 33.1 G/DL (ref 30–36.5)
MCV RBC AUTO: 79.4 FL (ref 80–99)
MONOCYTES # BLD: 0.7 K/UL (ref 0–1)
MONOCYTES NFR BLD: 9 % (ref 5–13)
NEUTS SEG # BLD: 3.5 K/UL (ref 1.8–8)
NEUTS SEG NFR BLD: 49 % (ref 32–75)
NRBC # BLD: 0 K/UL (ref 0–0.01)
NRBC BLD-RTO: 0 PER 100 WBC
PLATELET # BLD AUTO: 226 K/UL (ref 150–400)
PMV BLD AUTO: 10.4 FL (ref 8.9–12.9)
POTASSIUM SERPL-SCNC: 4.2 MMOL/L (ref 3.5–5.1)
RBC # BLD AUTO: 4.03 M/UL (ref 3.8–5.2)
SODIUM SERPL-SCNC: 139 MMOL/L (ref 136–145)
WBC # BLD AUTO: 7.2 K/UL (ref 3.6–11)

## 2022-06-15 PROCEDURE — 77030020829: Performed by: SURGERY

## 2022-06-15 PROCEDURE — 36415 COLL VENOUS BLD VENIPUNCTURE: CPT

## 2022-06-15 PROCEDURE — 77030008684 HC TU ET CUF COVD -B: Performed by: NURSE ANESTHETIST, CERTIFIED REGISTERED

## 2022-06-15 PROCEDURE — G0378 HOSPITAL OBSERVATION PER HR: HCPCS

## 2022-06-15 PROCEDURE — 77030008756 HC TU IRR SUC STRY -B: Performed by: SURGERY

## 2022-06-15 PROCEDURE — 77030008608 HC TRCR ENDOSC SMTH AMR -B: Performed by: SURGERY

## 2022-06-15 PROCEDURE — 77030013079 HC BLNKT BAIR HGGR 3M -A: Performed by: NURSE ANESTHETIST, CERTIFIED REGISTERED

## 2022-06-15 PROCEDURE — 74011000250 HC RX REV CODE- 250: Performed by: NURSE ANESTHETIST, CERTIFIED REGISTERED

## 2022-06-15 PROCEDURE — 77030009851 HC PCH RTVR ENDOSC AMR -B: Performed by: SURGERY

## 2022-06-15 PROCEDURE — 96375 TX/PRO/DX INJ NEW DRUG ADDON: CPT

## 2022-06-15 PROCEDURE — 77030035029 HC NDL INSUF VERES DISP COVD -B: Performed by: SURGERY

## 2022-06-15 PROCEDURE — 74011250636 HC RX REV CODE- 250/636: Performed by: NURSE ANESTHETIST, CERTIFIED REGISTERED

## 2022-06-15 PROCEDURE — 74011000250 HC RX REV CODE- 250: Performed by: SURGERY

## 2022-06-15 PROCEDURE — 47562 LAPAROSCOPIC CHOLECYSTECTOMY: CPT | Performed by: SURGERY

## 2022-06-15 PROCEDURE — 77030040361 HC SLV COMPR DVT MDII -B

## 2022-06-15 PROCEDURE — 77030031139 HC SUT VCRL2 J&J -A: Performed by: SURGERY

## 2022-06-15 PROCEDURE — 85025 COMPLETE CBC W/AUTO DIFF WBC: CPT

## 2022-06-15 PROCEDURE — 2709999900 HC NON-CHARGEABLE SUPPLY: Performed by: SURGERY

## 2022-06-15 PROCEDURE — 74011250636 HC RX REV CODE- 250/636: Performed by: ANESTHESIOLOGY

## 2022-06-15 PROCEDURE — 74011000250 HC RX REV CODE- 250: Performed by: ANESTHESIOLOGY

## 2022-06-15 PROCEDURE — 96376 TX/PRO/DX INJ SAME DRUG ADON: CPT

## 2022-06-15 PROCEDURE — 77030012770 HC TRCR OPT FX AMR -B: Performed by: SURGERY

## 2022-06-15 PROCEDURE — 88304 TISSUE EXAM BY PATHOLOGIST: CPT

## 2022-06-15 PROCEDURE — 76010000149 HC OR TIME 1 TO 1.5 HR: Performed by: SURGERY

## 2022-06-15 PROCEDURE — 74011000258 HC RX REV CODE- 258: Performed by: SURGERY

## 2022-06-15 PROCEDURE — 77030020053 HC ELECTRD LAPSCP COVD -B: Performed by: SURGERY

## 2022-06-15 PROCEDURE — 74011250637 HC RX REV CODE- 250/637: Performed by: SURGERY

## 2022-06-15 PROCEDURE — 77030037032 HC INSRT SCIS CLICKLLINE DISP STOR -B: Performed by: SURGERY

## 2022-06-15 PROCEDURE — 74011250636 HC RX REV CODE- 250/636: Performed by: SURGERY

## 2022-06-15 PROCEDURE — 77030026438 HC STYL ET INTUB CARD -A: Performed by: NURSE ANESTHETIST, CERTIFIED REGISTERED

## 2022-06-15 PROCEDURE — 76060000033 HC ANESTHESIA 1 TO 1.5 HR: Performed by: SURGERY

## 2022-06-15 PROCEDURE — 77030010935 HC CLP LIG ABSRB TELE -B: Performed by: SURGERY

## 2022-06-15 PROCEDURE — 81025 URINE PREGNANCY TEST: CPT

## 2022-06-15 PROCEDURE — 76210000006 HC OR PH I REC 0.5 TO 1 HR: Performed by: SURGERY

## 2022-06-15 PROCEDURE — 80048 BASIC METABOLIC PNL TOTAL CA: CPT

## 2022-06-15 PROCEDURE — 77030040922 HC BLNKT HYPOTHRM STRY -A

## 2022-06-15 PROCEDURE — 76210000021 HC REC RM PH II 0.5 TO 1 HR: Performed by: SURGERY

## 2022-06-15 PROCEDURE — 77030010507 HC ADH SKN DERMBND J&J -B: Performed by: SURGERY

## 2022-06-15 RX ORDER — HYDROMORPHONE HYDROCHLORIDE 1 MG/ML
.25-1 INJECTION, SOLUTION INTRAMUSCULAR; INTRAVENOUS; SUBCUTANEOUS
Status: DISCONTINUED | OUTPATIENT
Start: 2022-06-15 | End: 2022-06-15 | Stop reason: HOSPADM

## 2022-06-15 RX ORDER — ONDANSETRON 2 MG/ML
INJECTION INTRAMUSCULAR; INTRAVENOUS AS NEEDED
Status: DISCONTINUED | OUTPATIENT
Start: 2022-06-15 | End: 2022-06-15 | Stop reason: HOSPADM

## 2022-06-15 RX ORDER — NEOSTIGMINE METHYLSULFATE 1 MG/ML
INJECTION, SOLUTION INTRAVENOUS AS NEEDED
Status: DISCONTINUED | OUTPATIENT
Start: 2022-06-15 | End: 2022-06-15 | Stop reason: HOSPADM

## 2022-06-15 RX ORDER — SODIUM CHLORIDE 0.9 % (FLUSH) 0.9 %
5-40 SYRINGE (ML) INJECTION EVERY 8 HOURS
Status: DISCONTINUED | OUTPATIENT
Start: 2022-06-15 | End: 2022-06-15 | Stop reason: HOSPADM

## 2022-06-15 RX ORDER — LIDOCAINE HYDROCHLORIDE 20 MG/ML
INJECTION, SOLUTION EPIDURAL; INFILTRATION; INTRACAUDAL; PERINEURAL AS NEEDED
Status: DISCONTINUED | OUTPATIENT
Start: 2022-06-15 | End: 2022-06-15 | Stop reason: HOSPADM

## 2022-06-15 RX ORDER — OXYCODONE HYDROCHLORIDE 5 MG/1
5 TABLET ORAL AS NEEDED
Status: DISCONTINUED | OUTPATIENT
Start: 2022-06-15 | End: 2022-06-15 | Stop reason: HOSPADM

## 2022-06-15 RX ORDER — SERTRALINE HYDROCHLORIDE 25 MG/1
25 TABLET, FILM COATED ORAL DAILY
COMMUNITY

## 2022-06-15 RX ORDER — SODIUM CHLORIDE 0.9 % (FLUSH) 0.9 %
5-40 SYRINGE (ML) INJECTION AS NEEDED
Status: DISCONTINUED | OUTPATIENT
Start: 2022-06-15 | End: 2022-06-15 | Stop reason: HOSPADM

## 2022-06-15 RX ORDER — SODIUM CHLORIDE, SODIUM LACTATE, POTASSIUM CHLORIDE, CALCIUM CHLORIDE 600; 310; 30; 20 MG/100ML; MG/100ML; MG/100ML; MG/100ML
125 INJECTION, SOLUTION INTRAVENOUS CONTINUOUS
Status: DISCONTINUED | OUTPATIENT
Start: 2022-06-15 | End: 2022-06-15 | Stop reason: HOSPADM

## 2022-06-15 RX ORDER — LIDOCAINE HYDROCHLORIDE 10 MG/ML
0.1 INJECTION, SOLUTION EPIDURAL; INFILTRATION; INTRACAUDAL; PERINEURAL AS NEEDED
Status: DISCONTINUED | OUTPATIENT
Start: 2022-06-15 | End: 2022-06-15 | Stop reason: HOSPADM

## 2022-06-15 RX ORDER — NALOXONE HYDROCHLORIDE 0.4 MG/ML
0.2 INJECTION, SOLUTION INTRAMUSCULAR; INTRAVENOUS; SUBCUTANEOUS
Status: DISCONTINUED | OUTPATIENT
Start: 2022-06-15 | End: 2022-06-15 | Stop reason: HOSPADM

## 2022-06-15 RX ORDER — GLYCOPYRROLATE 0.2 MG/ML
INJECTION INTRAMUSCULAR; INTRAVENOUS AS NEEDED
Status: DISCONTINUED | OUTPATIENT
Start: 2022-06-15 | End: 2022-06-15 | Stop reason: HOSPADM

## 2022-06-15 RX ORDER — BUPIVACAINE HYDROCHLORIDE 5 MG/ML
INJECTION, SOLUTION EPIDURAL; INTRACAUDAL AS NEEDED
Status: DISCONTINUED | OUTPATIENT
Start: 2022-06-15 | End: 2022-06-15 | Stop reason: HOSPADM

## 2022-06-15 RX ORDER — HYDROMORPHONE HYDROCHLORIDE 2 MG/ML
INJECTION, SOLUTION INTRAMUSCULAR; INTRAVENOUS; SUBCUTANEOUS AS NEEDED
Status: DISCONTINUED | OUTPATIENT
Start: 2022-06-15 | End: 2022-06-15 | Stop reason: HOSPADM

## 2022-06-15 RX ORDER — ONDANSETRON 2 MG/ML
4 INJECTION INTRAMUSCULAR; INTRAVENOUS AS NEEDED
Status: DISCONTINUED | OUTPATIENT
Start: 2022-06-15 | End: 2022-06-15 | Stop reason: HOSPADM

## 2022-06-15 RX ORDER — DEXAMETHASONE SODIUM PHOSPHATE 4 MG/ML
INJECTION, SOLUTION INTRA-ARTICULAR; INTRALESIONAL; INTRAMUSCULAR; INTRAVENOUS; SOFT TISSUE AS NEEDED
Status: DISCONTINUED | OUTPATIENT
Start: 2022-06-15 | End: 2022-06-15 | Stop reason: HOSPADM

## 2022-06-15 RX ORDER — PROPOFOL 10 MG/ML
INJECTION, EMULSION INTRAVENOUS AS NEEDED
Status: DISCONTINUED | OUTPATIENT
Start: 2022-06-15 | End: 2022-06-15 | Stop reason: HOSPADM

## 2022-06-15 RX ORDER — HYDROCODONE BITARTRATE AND ACETAMINOPHEN 5; 325 MG/1; MG/1
1 TABLET ORAL
Qty: 30 TABLET | Refills: 0 | Status: SHIPPED | OUTPATIENT
Start: 2022-06-15 | End: 2022-06-22

## 2022-06-15 RX ORDER — SODIUM CHLORIDE, SODIUM LACTATE, POTASSIUM CHLORIDE, CALCIUM CHLORIDE 600; 310; 30; 20 MG/100ML; MG/100ML; MG/100ML; MG/100ML
100 INJECTION, SOLUTION INTRAVENOUS CONTINUOUS
Status: DISCONTINUED | OUTPATIENT
Start: 2022-06-15 | End: 2022-06-15 | Stop reason: HOSPADM

## 2022-06-15 RX ORDER — EPHEDRINE SULFATE/0.9% NACL/PF 50 MG/5 ML
SYRINGE (ML) INTRAVENOUS AS NEEDED
Status: DISCONTINUED | OUTPATIENT
Start: 2022-06-15 | End: 2022-06-15 | Stop reason: HOSPADM

## 2022-06-15 RX ORDER — SUCCINYLCHOLINE CHLORIDE 20 MG/ML
INJECTION INTRAMUSCULAR; INTRAVENOUS AS NEEDED
Status: DISCONTINUED | OUTPATIENT
Start: 2022-06-15 | End: 2022-06-15 | Stop reason: HOSPADM

## 2022-06-15 RX ORDER — FLUMAZENIL 0.1 MG/ML
0.2 INJECTION INTRAVENOUS
Status: DISCONTINUED | OUTPATIENT
Start: 2022-06-15 | End: 2022-06-15 | Stop reason: HOSPADM

## 2022-06-15 RX ORDER — FENTANYL CITRATE 50 UG/ML
INJECTION, SOLUTION INTRAMUSCULAR; INTRAVENOUS AS NEEDED
Status: DISCONTINUED | OUTPATIENT
Start: 2022-06-15 | End: 2022-06-15 | Stop reason: HOSPADM

## 2022-06-15 RX ORDER — KETOROLAC TROMETHAMINE 30 MG/ML
INJECTION, SOLUTION INTRAMUSCULAR; INTRAVENOUS AS NEEDED
Status: DISCONTINUED | OUTPATIENT
Start: 2022-06-15 | End: 2022-06-15 | Stop reason: HOSPADM

## 2022-06-15 RX ORDER — MIDAZOLAM HYDROCHLORIDE 1 MG/ML
INJECTION, SOLUTION INTRAMUSCULAR; INTRAVENOUS AS NEEDED
Status: DISCONTINUED | OUTPATIENT
Start: 2022-06-15 | End: 2022-06-15 | Stop reason: HOSPADM

## 2022-06-15 RX ORDER — ROCURONIUM BROMIDE 10 MG/ML
INJECTION, SOLUTION INTRAVENOUS AS NEEDED
Status: DISCONTINUED | OUTPATIENT
Start: 2022-06-15 | End: 2022-06-15 | Stop reason: HOSPADM

## 2022-06-15 RX ORDER — PHENYLEPHRINE HCL IN 0.9% NACL 0.4MG/10ML
SYRINGE (ML) INTRAVENOUS AS NEEDED
Status: DISCONTINUED | OUTPATIENT
Start: 2022-06-15 | End: 2022-06-15 | Stop reason: HOSPADM

## 2022-06-15 RX ORDER — HYDROCODONE BITARTRATE AND ACETAMINOPHEN 5; 325 MG/1; MG/1
1 TABLET ORAL ONCE
Status: COMPLETED | OUTPATIENT
Start: 2022-06-15 | End: 2022-06-15

## 2022-06-15 RX ORDER — DIPHENHYDRAMINE HYDROCHLORIDE 50 MG/ML
12.5 INJECTION, SOLUTION INTRAMUSCULAR; INTRAVENOUS AS NEEDED
Status: DISCONTINUED | OUTPATIENT
Start: 2022-06-15 | End: 2022-06-15 | Stop reason: HOSPADM

## 2022-06-15 RX ADMIN — Medication 10 ML: at 14:09

## 2022-06-15 RX ADMIN — SUCCINYLCHOLINE CHLORIDE 100 MG: 20 INJECTION, SOLUTION INTRAMUSCULAR; INTRAVENOUS at 15:53

## 2022-06-15 RX ADMIN — HYDROMORPHONE HYDROCHLORIDE 0.5 MG: 1 INJECTION, SOLUTION INTRAMUSCULAR; INTRAVENOUS; SUBCUTANEOUS at 17:18

## 2022-06-15 RX ADMIN — FENTANYL CITRATE 50 MCG: 50 INJECTION, SOLUTION INTRAMUSCULAR; INTRAVENOUS at 16:00

## 2022-06-15 RX ADMIN — DEXAMETHASONE SODIUM PHOSPHATE 4 MG: 4 INJECTION, SOLUTION INTRAMUSCULAR; INTRAVENOUS at 16:00

## 2022-06-15 RX ADMIN — SODIUM CHLORIDE, POTASSIUM CHLORIDE, SODIUM LACTATE AND CALCIUM CHLORIDE 125 ML/HR: 600; 310; 30; 20 INJECTION, SOLUTION INTRAVENOUS at 14:08

## 2022-06-15 RX ADMIN — POTASSIUM CHLORIDE, DEXTROSE MONOHYDRATE AND SODIUM CHLORIDE 125 ML/HR: 150; 5; 450 INJECTION, SOLUTION INTRAVENOUS at 00:36

## 2022-06-15 RX ADMIN — KETOROLAC TROMETHAMINE 30 MG: 30 INJECTION, SOLUTION INTRAMUSCULAR; INTRAVENOUS at 16:30

## 2022-06-15 RX ADMIN — Medication 3 MG: at 16:28

## 2022-06-15 RX ADMIN — GLYCOPYRROLATE 0.4 MG: 0.2 INJECTION INTRAMUSCULAR; INTRAVENOUS at 16:28

## 2022-06-15 RX ADMIN — Medication 10 MG: at 16:32

## 2022-06-15 RX ADMIN — ROCURONIUM BROMIDE 20 MG: 10 INJECTION INTRAVENOUS at 16:00

## 2022-06-15 RX ADMIN — ONDANSETRON HYDROCHLORIDE 4 MG: 2 SOLUTION INTRAMUSCULAR; INTRAVENOUS at 16:00

## 2022-06-15 RX ADMIN — Medication 80 MCG: at 16:13

## 2022-06-15 RX ADMIN — HYDROMORPHONE HYDROCHLORIDE 1 MG: 1 INJECTION, SOLUTION INTRAMUSCULAR; INTRAVENOUS; SUBCUTANEOUS at 13:23

## 2022-06-15 RX ADMIN — HYDROMORPHONE HYDROCHLORIDE 0.5 MG: 2 INJECTION INTRAMUSCULAR; INTRAVENOUS; SUBCUTANEOUS at 16:35

## 2022-06-15 RX ADMIN — SODIUM CHLORIDE, PRESERVATIVE FREE 10 ML: 5 INJECTION INTRAVENOUS at 14:09

## 2022-06-15 RX ADMIN — METRONIDAZOLE 500 MG: 500 INJECTION, SOLUTION INTRAVENOUS at 09:15

## 2022-06-15 RX ADMIN — CEFEPIME HYDROCHLORIDE 2 G: 2 INJECTION, POWDER, FOR SOLUTION INTRAVENOUS at 16:00

## 2022-06-15 RX ADMIN — MIDAZOLAM HYDROCHLORIDE 2 MG: 1 INJECTION, SOLUTION INTRAMUSCULAR; INTRAVENOUS at 15:46

## 2022-06-15 RX ADMIN — Medication 80 MCG: at 15:59

## 2022-06-15 RX ADMIN — LIDOCAINE HYDROCHLORIDE 60 MG: 20 INJECTION, SOLUTION EPIDURAL; INFILTRATION; INTRACAUDAL; PERINEURAL at 15:52

## 2022-06-15 RX ADMIN — CEFEPIME HYDROCHLORIDE 2 G: 2 INJECTION, POWDER, FOR SOLUTION INTRAVENOUS at 05:23

## 2022-06-15 RX ADMIN — ROCURONIUM BROMIDE 10 MG: 10 INJECTION INTRAVENOUS at 15:52

## 2022-06-15 RX ADMIN — HYDROMORPHONE HYDROCHLORIDE 0.5 MG: 2 INJECTION INTRAMUSCULAR; INTRAVENOUS; SUBCUTANEOUS at 16:53

## 2022-06-15 RX ADMIN — HYDROCODONE BITARTRATE AND ACETAMINOPHEN 1 TABLET: 5; 325 TABLET ORAL at 18:00

## 2022-06-15 RX ADMIN — FENTANYL CITRATE 50 MCG: 50 INJECTION, SOLUTION INTRAMUSCULAR; INTRAVENOUS at 15:52

## 2022-06-15 RX ADMIN — PROPOFOL 200 MG: 10 INJECTION, EMULSION INTRAVENOUS at 15:52

## 2022-06-15 RX ADMIN — Medication 10 ML: at 05:23

## 2022-06-15 NOTE — DISCHARGE INSTRUCTIONS
Patient Education        Colecistectomía: Johanny Sklindsay en el hogar  Cholecystectomy: What to Expect at Home  Iverson recuperación  [facility], es normal sentirse débil y fatigado por varios días después de regresar al hogar. Es posible que tenga el abdomen hinchado. Si le bowens hecho rachell cirugía laparoscópica, también podría sentir dolor en el hombro arjun unas 24 horas. Es posible que tenga gases o necesite eructar mucho al principio, y a [address]. La diarrea suele desaparecer en el transcurso de 2 a 4 semanas, christiana podría durar más. El tiempo que tarde en recuperarse depende de si le bowens hecho rachell cirugía laparoscópica o abierta. · En el zachary de [facility] laparoscópica, la mayoría de las personas pueden volver a trabajar o hacer iverson rutina habitual en 1 a 2 semanas, christiana podría tardar más, según el tipo de trabajo que jyoti. · En el zachary de [facility], es probable que tarde de 4 a 6 semanas en poder volver a iverson rutina habitual.  Esta hoja de [employer] idea general del tiempo que le llevará recuperarse. No obstante, cada persona se recupera a un ritmo diferente. Siga los pasos que se mencionan a continuación para recuperarse lo más rápido posible. ¿Cómo puede cuidarse en el hogar? Actividad    · Descanse cuando se sienta fatigado. Dormir lo suficiente le ayudará a recuperarse.     · Trate de caminar todos los días. Comience caminando un poco más que el día anterior. Caminar ayuda a prevenir coágulos de [employer] piernas y neumonía.     · Evite levantar cualquier cosa que implique un esfuerzo arjun unas 2 a 4 semanas.  [place] podría incluir un casey, bolsas de las compras y envases de [place] pesados, mochilas o maletines pesados, bolsas de arena para excremento de rebecca o alimentos para perros, o rachell aspiradora.     · Evite actividades vigorosas, johanna [employer], trotar, levantar pesas y hacer ejercicio aeróbico, hasta que iverson médico lo apruebe.     · Pregúntele al médico cuándo puede volver a conducir.     · Para la cirugía laparoscópica, la mayoría de las personas pueden volver a trabajar o hacer iverson rutina normal en 1 a 2 semanas, aunque podrían [employer]. En el zachary de [place], es probable que tarde de 4 a 6 semanas en poder volver a iverson rutina habitual.     · Iverson médico le indicará cuándo puede volver a tener relaciones sexuales. Alimentación    · Cuando tenga ganas de comer, comience con pequeñas cantidades de alimento. Es posible que desee evitar las comidas grasas johanna los alimentos fritos o el queso arjun un Gracie. Pueden causar síntomas johanna diarrea o hinchazón.     · Joann mucho líquido (a menos que el médico le indique que no lo jyoti).     · Puede notar que no evacua el intestino con regularidad krish después de la [place]. [place] es común. Trate de evitar el estreñimiento y hacer esfuerzos al evacuar el intestino. Es posible que desee lillian un suplemento de [employer]. Si no ha evacuado el intestino al cabo de un par de días, pregúntele al médico acerca de lillian un laxante suave. Medicamentos    · El médico le dirá si puede comenzar a lillian los medicamentos de [place] y cuándo puede [place]. También le dará instrucciones acerca de lillian cualquier [employer].     · Si chanell aspirina o cualquier otro medicamento que previene los coágulos de [place], pregúntele a iverson médico si debería volver a tomarlo y en qué momento. Asegúrese de entender exactamente lo que iverson médico quiere que jyoti.     · Sea kaushik con los medicamentos. Maribel y siga todas las indicaciones de la [place]. ? Si el médico le recetó un analgésico, tómelo según las indicaciones. ? Si no está tomando un analgésico recetado, pregúntele al médico si puede lillian andrea de [employer]. ? No tome dos o más analgésicos al mismo tiempo a menos que el médico se lo haya indicado. Muchos analgésicos contienen acetaminofén, que es Tylenol.  El exceso de Tylenol puede ser dañino.     · Si melissa que el analgésico le está causando malestar estomacal:  ? [place] el medicamento después de las comidas (a menos que iverson médico le indique lo contrario). ? [place] al médico un analgésico diferente.     · Si iverson médico le recetó antibióticos, tómelos según las indicaciones. No deje de tomarlos por el hecho de sentirse mejor. Debe lillian todos los antibióticos hasta terminarlos. Cuidado de la incisión    · Si tiene tiras de cinta adhesiva sobre el yamilka (incisión) que le hizo el médico, deje la cinta puesta arjun rachell semana o hasta que se caiga.     · Puede ducharse entre 24 y 50 horas después de la [place], si el médico lo autoriza. Seque la incisión con toques suaves. No se bañe en la teresa arjun las primeras 2 semanas, o hasta que iverson médico lo autorice.     · Es posible que tenga grapas para mantener el yamilka cerrado. Manténgalas secas hasta que iverson [address] [facility] [address]. [place] es por lo general en 7 a 10 días.     · Mantenga la mir limpia y seca. Puede cubrirla con un vendaje de gasa si exuda líquido o roza contra la ropa. Cambie el vendaje a diario. Hielo    · Para reducir la hinchazón y el dolor, póngase hielo o rachell compresa fría sobre el abdomen por entre 10 y 21 minutos cada vez. Robby esto cada 1 o 2 horas. Póngase un paño dennis entre el hielo y la piel. La atención de seguimiento es rachell parte clave de iverson tratamiento y seguridad. Asegúrese de hacer y acudir a todas las citas, y llame a iverson médico si está teniendo problemas. También es rachell buena idea saber los resultados de maurisio exámenes y mantener rachell lista de los medicamentos que chanell. ¿Cuándo debe pedir ayuda? Llame al 911 en cualquier momento que considere que necesita atención de [place]. Por ejemplo, llame si:    · Se desmayó (perdió el conocimiento).     · Tiene serias dificultades para respirar.     · Tiene dolor repentino en el pecho y falta de [place], o tose tereza.    Llame a iverson médico ahora mismo o busque atención médica inmediata si:    · Siente el estómago revuelto y no puede beber líquidos.     · Tiene dolor que no mejora después de lillian analgésicos.     · Tiene señales de infección, tales johanna:  ? Aumento del dolor, la hinchazón, el enrojecimiento o la temperatura. ? Vetas rojizas que salen de la incisión. ? Pus que supura de la incisión. ? Ganglios linfáticos inflamados en el gabriella, las axilas o la loli. ? Catherne Chatters.     · La orina es de color amarronado oscuro o las heces son de color melissa o del color de la arcilla.     · La piel o la parte chirag de los ojos se le ponen amarillentas.     · La venda colocada sobre la incisión se empapa con tereza de color trejo vivo.     · Tiene señales de un coágulo de tereza, tales johanna:  ? Dolor en la pantorrilla, el muslo, la loli o detrás de la rodilla. ? Enrojecimiento e hinchazón en la pierna o la loli.     · Tiene problemas para orinar o evacuar el intestino, en especial si tiene dolor leve o hinchazón en la parte baja del abdomen. Preste especial atención a cualquier cambio en iverson perez y asegúrese de comunicarse con iverson médico si:    · Le bowens hecho rachell [place] y el dolor en el hombro dura más de 24 horas.     · No evacua el intestino después de lillian un laxante. · Llame al [phone] por une sacha con Dr. Chad Chen in [place]. ¿Dónde puede encontrar más información en inglés? Mabel Mins a [email or web address].[email or web address]/  Enrique [phone] en la búsqueda para aprender [place]ch de \"Colecistectomía: Qué esperar en el hogar. \"  Revisado: 8 septiembre, 2021               Versión del contenido: 13.2  © 5861-8207 Healthwise, [employer]. Las instrucciones de cuidado fueron adaptadas bajo licencia por [employer] (which disclaims liability or warranty for this information). Si usted tiene [place] afección médica o sobre estas instrucciones, siempre pregunte a iverson profesional de perez.  [employer] niega toda garantía o responsabilidad por iverson uso de esta información. DISCHARGE SUMMARY from your Nurse      PATIENT INSTRUCTIONS    After general anesthesia or intravenous sedation, for 24 hours or while taking prescription Narcotics:  · Limit your activities  · Do not drive and operate hazardous machinery  · Do not make important personal or business decisions  · Do  not drink alcoholic beverages  · If you have not urinated within 8 hours after discharge, please contact your surgeon on call. Report the following to your surgeon:  · Excessive pain, swelling, redness or odor of or around the surgical area  · Temperature over 100.5  · Nausea and vomiting lasting longer than 4 hours or if unable to take medications  · Any signs of decreased circulation or nerve impairment to extremity: change in color, persistent  numbness, tingling, coldness or increase pain  · Any questions      GOOD HELP TO FIGHT AN INFECTION  Here are a few tip to help reduce the chance of getting an infection after surgery:   Wash Your Hands   Good handwashing is the most important thing you and your caregiver can do.  Wash before and after caring for any wounds. Dry your hand with a clean towel.  Wash with soap and water for at least 20 seconds. A TIP: sing the \"Happy Birthday\" song through one time while washing to help with the timing.  Use a hand  in between washings.  Shower   When your surgeon says it is OK to take a shower, use a new bar of antibacterial soap (if that is what you use, and keep that bar of soap ONLY for your use), or antibacterial body wash.  Use a clean wash cloth or sponge when you bathe.  Dry off with a clean towel  after every bath - be careful around any wounds, skin staples, sutures or surgical glue over/on wounds.  Do not enter swimming pools, hot tubs, lakes, rivers and/or ocean until wounds are healed and your doctor/surgeon says it is OK.      Use Clean Sheets   Sleep on freshly laundered sheets after your surgery.  Keep the surgery site covered with a clean, dry bandage (if instructed to do so). If the bandage becomes soiled, reapply a new, dry, clean bandage.  Do not allow pets to sleep with you while your wound is healing.  Lifestyle Modification and Controlling Your Blood Sugar   Smoking slows wound healing. Stop smoking and limit exposure to second-hand smoke.  High blood sugar slows wound healing. Eat a well-balanced diet to provide proper nutrition while healing   Monitor your blood sugar (if you are a diabetic) and take your medications as you are suppose to so you can control you blood sugar after surgery. COUGH AND DEEP BREATHE    Breathing deeply and coughing are very important exercises to do after surgery. Deep breathing and coughing open the little air tubes and air sacks in your lungs. You take deep breaths every day. You may not even notice - it is just something you do when you sigh or yawn. It is a natural exercise you do to keep these air passages open. After surgery, take deep breaths and cough, on purpose. DIRECTIONS:  · Take 10 to 15 slow deep breaths every hour while awake. · Breathe in deeply, and hold it for 2 seconds. · Exhale slowly through puckered lips, like blowing up a balloon. · After every 4th or 5th deep breath, hug your pillow to your chest or belly and give a hard, deep cough. Yes, it will probably hurt. But doing this exercise is a very important part of healing after surgery. Take your pain medicine to help you do this exercise without too much pain. Coughing and deep breathing help prevent bronchitis and pneumonia after surgery. If you had chest or belly surgery, use a pillow as a \"hug easton\" and hold it tightly to your chest or belly when you cough.        ANKLE PUMPS    Ankle pumps increase the circulation of oxygenated blood to your lower extremities and decrease your risk for circulation problems such as blood clots. They also stretch the muscles, tendons and ligaments in your foot and ankle, and prevent joint contracture in the ankle and foot, especially after surgeries on the legs. It is important to do ankle pump exercises regularly after surgery because immobility increases your risk for developing a blood clot. Your doctor may also have you take an Aspirin for the next few days as well. If your doctor did not ask you to take an Aspirin, consult with him before starting Aspirin therapy on your own. The exercise is quite simple. · Slowly point your foot forward, feeling the muscles on the top of your lower leg stretch, and hold this position for 5 seconds. · Next, pull your foot back toward you as far as possible, stretching the calf muscles, and hold that position for 5 seconds. · Repeat with the other foot. · Perform 10 repetitions every hour while awake for both ankles if possible (down and then up with the foot once is one repetition). You should feel gentle stretching of the muscles in your lower leg when doing this exercise. If you feel pain, or your range of motion is limited, don't push too hard. Only go the limit your joint and muscles will let you go. If you have increasing pain, progressively worsening leg warmth or swelling, STOP the exercise and call your doctor. MEDICATION AND   SIDE EFFECT GUIDE    The [facility] MEDICATION AND SIDE EFFECT GUIDE was provided to the PATIENT AND CARE PROVIDER. Information provided includes instruction about drug purpose and common side effects for the following medications:   · Norco        These are general instructions for a healthy lifestyle:    *   Please give a list of your current medications to your Primary Care Provider.   *   Please update this list whenever your medications are discontinued, doses are changed, or new medications (including over-the-counter products) are added.  *   Please carry medication information at all times in case of emergency situations. About Smoking  No smoking / No tobacco products  Avoid exposure to second hand smoke     Surgeon General's Warning:  Quitting smoking now greatly reduces serious risk to your health. Obesity, smoking, and sedentary lifestyle greatly increases your risk for illness and disease. A healthy diet, regular physical exercise & weight monitoring are important for maintaining a healthy lifestyle. Congestive Heart Failure  You may be retaining fluid if you have a history of heart failure or if you experience any of the following symptoms:  Weight gain of 3 pounds or more overnight or 5 pounds in a week, increased swelling in your hands or feet or shortness of breath while lying flat in bed. Please call your doctor as soon as you notice any of these symptoms; do not wait until your next office visit. Recognize signs and symptoms of STROKE:  F -  Face looks uneven  A -  Arms unable to move or move evenly  S -  Speech slurred or non-existent  T -  Time-call 911 as soon as signs and symptoms begin-DO NOT go          back to bed or wait to see if you get better-TIME IS BRAIN. Warning Signs of HEART ATTACK   Call 911 if you have these symptoms:     Chest discomfort. Most heart attacks involve discomfort in the center of the chest that lasts more than a few minutes, or that goes away and comes back. It can feel like uncomfortable pressure, squeezing, fullness, or pain.  Discomfort in other areas of the upper body. Symptoms can include pain or discomfort in one or both arms, the back, neck, jaw, or stomach.  Shortness of breath with or without chest discomfort.  Other signs may include breaking out in a cold sweat, nausea, or lightheadedness. Don't wait more than five minutes to call 911 - MINUTES MATTER! Fast action can save your life.  Calling 911 is almost always the fastest way to get lifesaving treatment. Emergency Medical Services staff can begin treatment when they arrive -- up to an hour sooner than if someone gets to the hospital by car. Learning About Coronavirus [phone])  Coronavirus [phone]): Overview  What is coronavirus (COVID-19)? The coronavirus disease (COVID-19) is caused by a virus. It is an illness that was first found in [place], [place], in December 2019. It has since spread worldwide. The virus can cause fever, cough, and trouble breathing. In severe cases, it can cause pneumonia and make it hard to breathe without help. It can cause death. Coronaviruses are a large group of viruses. They cause the common cold. They also cause more serious illnesses like Middle East respiratory syndrome (MERS) and severe acute respiratory syndrome (SARS). COVID-19 is caused by a novel coronavirus. That means it's a new type that has not been seen in people before. This virus spreads person-to-person through droplets from coughing and sneezing. It can also spread when you are close to someone who is infected. And it can spread when you touch something that has the virus on it, such as a doorknob or a tabletop. What can you do to protect yourself from coronavirus (COVID-19)? The best way to protect yourself from getting sick is to:  · Avoid areas where there is an outbreak. · Avoid contact with people who may be infected. · Wash your hands often with soap or alcohol-based hand sanitizers. · Avoid crowds and try to stay at least 6 feet away from other people. · Wash your hands often, especially after you cough or sneeze. Use soap and water, and scrub for at least 20 seconds. If soap and water aren't available, use an alcohol-based hand . · Avoid touching your mouth, nose, and eyes. What can you do to avoid spreading the virus to others? To help avoid spreading the virus to others:  · Cover your mouth with a tissue when you cough or sneeze.  Then throw the tissue in the trash.  · Use a disinfectant to clean things that you touch often. · Stay home if you are sick or have been exposed to the virus. Don't go to school, work, or public areas. And don't use public transportation. · If you are sick:  ? Leave your home only if you need to get medical care. But call the doctor's office first so they know you're coming. And wear a face mask, if you have one.  ? If you have a face mask, wear it whenever you're around other people. It can help stop the spread of the virus when you cough or sneeze. ? Clean and disinfect your home every day. Use household  and disinfectant wipes or sprays. Take special care to clean things that you grab with your hands. These include doorknobs, remote controls, phones, and handles on your refrigerator and microwave. And don't forget countertops, tabletops, bathrooms, and computer keyboards. When to call for help  Call 911 anytime you think you may need emergency care. For example, call if:  · You have severe trouble breathing. (You can't talk at all.)  · You have constant chest pain or pressure. · You are severely dizzy or lightheaded. · You are confused or can't think clearly. · Your face and lips have a blue color. · You pass out (lose consciousness) or are very hard to wake up. Call your doctor now if you develop symptoms such as:  · Shortness of breath. · Fever. · Cough. If you need to get care, call ahead to the doctor's office for instructions before you go. Make sure you wear a face mask, if you have one, to prevent exposing other people to the virus. Where can you get the latest information? The following health organizations are tracking and studying this virus. Their websites contain the most up-to-date information. [place] Rumps also learn what to do if you think you may have been exposed to the virus. · U.S. Centers for Disease Control and Prevention (CDC): The CDC provides updated news about the disease and travel advice.  The website also tells you how to prevent the spread of infection. www.cdc.gov  · World Health Organization [facility]): WHO offers information about the virus outbreaks. WHO also has travel advice. www.who.int  Current as of: April 1, 2020               Content Version: 12.4  © 3169-2412 Healthwise, Incorporated. Care instructions adapted under license by your healthcare professional. If you have questions about a medical condition or this instruction, always ask your healthcare professional. [address] any warranty or liability for your use of this information. The discharge information has been reviewed with the caregiver. Any questions and concerns from the caregiver have been addressed. The caregiver verbalized understanding. CONTENTS FOUND IN YOUR DISCHARGE ENVELOPE:  [x]     Surgeon and Hospital Discharge Instructions  [x]     [facility] Surgical Services Care Provider Card  [x]     Medication & Side Effect Guide            (your newly prescribed medications have been marked/highlighted showing the most common side effects from   the classes of drugs on your prescriptions)  [x]     Medication Prescription(s) x Norco ( [x] These have been sent electronically to your pharmacy by your surgeon,   - OR -       your surgeon has already provided these to you during a previous/pre-op office visit)  []     [address]  []     Physical Therapy Prescription  []     Follow-up Appointment Cards  []     Surgery-related Pictures/Media  []     Pain block and/or block with On-Q Catheter from Anesthesia Service (information included in your instructions above)  []     Medical device information sheets/pamphlets from their    []     School/work excuse note. []     /parent work excuse note.       The following personal items collected during your admission are returned to you:   Dental Appliance: Dental Appliances: None  Vision: Visual Aid: Glasses,With patient  Hearing Aid:    Jewelry: Jewelry: None  Clothing: Clothing: Other (comment) (street clothes to PACU locker)  Other Valuables:  Other Valuables: Cell Phone,Wallet,Eyeglasses  Valuables sent to safe:

## 2022-06-15 NOTE — ANESTHESIA PREPROCEDURE EVALUATION
Relevant Problems   NEUROLOGY   (+) Generalized anxiety disorder   (+) Moderate major depression, single episode (HCC)      GASTROINTESTINAL   (+) Gastroesophageal reflux disease with esophagitis      ENDOCRINE   (+) Obesity       Anesthetic History   No history of anesthetic complications            Review of Systems / Medical History  Patient summary reviewed and pertinent labs reviewed    Pulmonary  Within defined limits                 Neuro/Psych   Within defined limits           Cardiovascular  Within defined limits                     GI/Hepatic/Renal  Within defined limits              Endo/Other        Morbid obesity and anemia     Other Findings            Physical Exam    Airway  Mallampati: II    Neck ROM: normal range of motion   Mouth opening: Normal     Cardiovascular    Rhythm: regular  Rate: normal         Dental  No notable dental hx       Pulmonary  Breath sounds clear to auscultation               Abdominal  GI exam deferred       Other Findings            Anesthetic Plan    ASA: 2  Anesthesia type: general          Induction: Intravenous  Anesthetic plan and risks discussed with: Patient

## 2022-06-15 NOTE — PROGRESS NOTES
[address] [facility]      Subjective     No acute events  Pain is better, but still feels a little bit of pain in RUQ    Objective     Patient Vitals for the past 24 hrs:   Temp Pulse Resp BP SpO2   06/15/22 1345 97.8 °F (36.6 °C) 71 15 136/69 100 %   06/15/22 1051 98.2 °F (36.8 °C) 71 16 115/63 99 %   06/15/22 0856 97.9 °F (36.6 °C) 77 14 110/60 99 %   06/15/22 0349 97.8 °F (36.6 °C) 71 16 101/66 100 %   06/15/22 0015 97.7 °F (36.5 °C) 66 18 112/61 99 %   06/14/22 2226 98.6 °F (37 °C) 72 18 128/76 99 %   06/14/22 1533 98.3 °F (36.8 °C) 82 16 125/79 99 %           PE  GEN - Awake, alert, communicating appropriately. NAD  Pulm - CTAB  CV - RRR  Abd - soft, ND, mild TTP RUQ with deep breathing    Labs  Recent Results (from the past 24 hour(s))   CBC WITH AUTOMATED DIFF    Collection Time: 06/14/22  3:53 PM   Result Value Ref Range    WBC 7.4 3.6 - 11.0 K/uL    RBC 4.84 3.80 - 5.20 M/uL    HGB 12.8 11.5 - 16.0 g/dL    HCT 37.9 35.0 - 47.0 %    MCV 78.3 (L) 80.0 - 99.0 FL    MCH 26.4 26.0 - 34.0 PG    MCHC 33.8 30.0 - 36.5 g/dL    RDW 12.2 11.5 - 14.5 %    PLATELET 075 353 - 420 K/uL    MPV 10.3 8.9 - 12.9 FL    NRBC 0.0 0  WBC    ABSOLUTE NRBC 0.00 0.00 - 0.01 K/uL    NEUTROPHILS 66 32 - 75 %    LYMPHOCYTES 26 12 - 49 %    MONOCYTES 7 5 - 13 %    EOSINOPHILS 1 0 - 7 %    BASOPHILS 0 0 - 1 %    IMMATURE GRANULOCYTES 0 0.0 - 0.5 %    ABS. NEUTROPHILS 4.9 1.8 - 8.0 K/UL    ABS. LYMPHOCYTES 1.9 0.8 - 3.5 K/UL    ABS. MONOCYTES 0.5 0.0 - 1.0 K/UL    ABS. EOSINOPHILS 0.1 0.0 - 0.4 K/UL    ABS. BASOPHILS 0.0 0.0 - 0.1 K/UL    ABS. IMM.  GRANS. 0.0 0.00 - 0.04 K/UL    DF AUTOMATED     METABOLIC PANEL, COMPREHENSIVE    Collection Time: 06/14/22  3:53 PM   Result Value Ref Range    Sodium 140 136 - 145 mmol/L    Potassium 3.9 3.5 - 5.1 mmol/L    Chloride 108 97 - 108 mmol/L    CO2 26 21 - 32 mmol/L    Anion gap 6 5 - 15 mmol/L    Glucose 154 (H) 65 - 100 mg/dL BUN 15 6 - 20 MG/DL    Creatinine 1.16 (H) 0.55 - 1.02 MG/DL    BUN/Creatinine ratio 13 12 - 20      GFR est AA >60 >60 ml/min/1.73m2    GFR est non-AA 56 (L) >60 ml/min/1.73m2    Calcium 9.2 8.5 - 10.1 MG/DL    Bilirubin, total 0.5 0.2 - 1.0 MG/DL    ALT (SGPT) 51 12 - 78 U/L    AST (SGOT) 26 15 - 37 U/L    Alk. phosphatase 66 45 - 117 U/L    Protein, total 8.0 6.4 - 8.2 g/dL    Albumin 3.6 3.5 - 5.0 g/dL    Globulin 4.4 (H) 2.0 - 4.0 g/dL    A-G Ratio 0.8 (L) 1.1 - 2.2     LIPASE    Collection Time: 06/14/22  3:53 PM   Result Value Ref Range    Lipase 154 73 - 393 U/L   URINALYSIS W/MICROSCOPIC    Collection Time: 06/14/22  4:03 PM   Result Value Ref Range    Color YELLOW/STRAW      Appearance CLOUDY (A) CLEAR      Specific gravity 1.022 1.003 - 1.030      pH (UA) 5.5 5.0 - 8.0      Protein Negative NEG mg/dL    Glucose Negative NEG mg/dL    Ketone TRACE (A) NEG mg/dL    Bilirubin Negative NEG      Blood Negative NEG      Urobilinogen 1.0 0.2 - 1.0 EU/dL    Nitrites Negative NEG      Leukocyte Esterase LARGE (A) NEG      WBC >100 (H) 0 - 4 /hpf    RBC 0-5 0 - 5 /hpf    Epithelial cells MANY (A) FEW /lpf    Bacteria 1+ (A) NEG /hpf    Hyaline cast 0-2 0 - 2 /lpf   URINE CULTURE HOLD SAMPLE    Collection Time: 06/14/22  4:03 PM    Specimen: Serum; Urine   Result Value Ref Range    Urine culture hold        Urine on hold in Microbiology dept for 2 days. If unpreserved urine is submitted, it cannot be used for addtional testing after 24 hours, recollection will be required.    METABOLIC PANEL, BASIC    Collection Time: 06/15/22  2:51 AM   Result Value Ref Range    Sodium 139 136 - 145 mmol/L    Potassium 4.2 3.5 - 5.1 mmol/L    Chloride 110 (H) 97 - 108 mmol/L    CO2 23 21 - 32 mmol/L    Anion gap 6 5 - 15 mmol/L    Glucose 247 (H) 65 - 100 mg/dL    BUN 15 6 - 20 MG/DL    Creatinine 0.88 0.55 - 1.02 MG/DL    BUN/Creatinine ratio 17 12 - 20      GFR est AA >60 >60 ml/min/1.73m2    GFR est non-AA >60 >60 ml/min/1.73m2    Calcium 7.7 (L) 8.5 - 10.1 MG/DL   CBC WITH AUTOMATED DIFF    Collection Time: 06/15/22  2:51 AM   Result Value Ref Range    WBC 7.2 3.6 - 11.0 K/uL    RBC 4.03 3.80 - 5.20 M/uL    HGB 10.6 (L) 11.5 - 16.0 g/dL    HCT 32.0 (L) 35.0 - 47.0 %    MCV 79.4 (L) 80.0 - 99.0 FL    MCH 26.3 26.0 - 34.0 PG    MCHC 33.1 30.0 - 36.5 g/dL    RDW 12.1 11.5 - 14.5 %    PLATELET 894 402 - 845 K/uL    MPV 10.4 8.9 - 12.9 FL    NRBC 0.0 0  WBC    ABSOLUTE NRBC 0.00 0.00 - 0.01 K/uL    NEUTROPHILS 49 32 - 75 %    LYMPHOCYTES 40 12 - 49 %    MONOCYTES 9 5 - 13 %    EOSINOPHILS 2 0 - 7 %    BASOPHILS 0 0 - 1 %    IMMATURE GRANULOCYTES 0 0.0 - 0.5 %    ABS. NEUTROPHILS 3.5 1.8 - 8.0 K/UL    ABS. LYMPHOCYTES 2.9 0.8 - 3.5 K/UL    ABS. MONOCYTES 0.7 0.0 - 1.0 K/UL    ABS. EOSINOPHILS 0.2 0.0 - 0.4 K/UL    ABS. BASOPHILS 0.0 0.0 - 0.1 K/UL    ABS. IMM. GRANS. 0.0 0.00 - 0.04 K/UL    DF AUTOMATED     HCG URINE, QL. - POC    Collection Time: 06/15/22  2:09 PM   Result Value Ref Range    Pregnancy test,urine (POC) Negative NEG           Assessment     Corinne Samuel is a 27 y. o.yr old female with acute cholecystitis  Somewhat better clinically with antibiotics and bowel rest    Plan     To OR for laparoscopic cholecystectomy  Risks discussed with patient  NPO in meantime    30 mins of time was spent with the patient of which > 50% of the time involved face-to-face counseling of the patient regarding the proposed treatment plan.     Isela Oliveira MD

## 2022-06-15 NOTE — OP NOTES
OPERATIVE NOTE    Date of Procedure: 6/15/2022   Preoperative Diagnosis: cholecystitis  Postoperative Diagnosis: cholecystitis    Procedure: Procedure(s):  CHOLECYSTECTOMY LAPAROSCOPIC    Surgeon: Nic Venegas MD  Assistant(s): ELAINE Llamas   Anesthesia: General   Estimated Blood Loss: minimal  Specimens:   ID Type Source Tests Collected by Time Destination   1 : gallbladder Preservative Gallbladder  Ne Christian MD 6/15/2022 1025 Pathology      Findings: mildly inflamed gallbladder with large stone in neck  Indications: 27year old female presesnted to ED with signs of acute cholecystitis. I consented her for laparoscopic cholecystectomy. She was placed on IV antibiotics and admitted prior to surgery. Description of Operation: Maryann Azar was identified in the pre-operative holding area. Informed consent was obtained after a complete discussion of risks benefits and alternatives to surgery were had with the patient. Risks discussed included bleeding, infection, and injury to bile ducts or other structures. The patient was brought back to the OR and placed under general endotracheal anesthesia in the supine position on the operating room table with the arms extended. The patient was then prepped and draped in the usual sterile fashion. A proper timeout was performed. Pre-incisions antibiotics were given. We then injected local anesthetic into the samantha-umbilical skin and subcutaneous tissue. A 5 mm  incision was then made using an 11 blade. This was grasped and retracted anteriorly. A Veress needle was then passed into the abdomen and a standard water-drop technique test was performed. The abdomen was insufflated to 15 mm Hg. The Veress was then removed and a 5 mm Optiview trocar was placed with the scope inserted. The abdomen was entered safely.   Additional trocars including a 12 mm subxyphoid port and two 5 mm RUQ trocars were placed in usual locations for laparoscopic cholecystectomy. These were all placed after injection of local anesthetic and under direct visualization. The patient was placed in steep reverse Trendelenburg position. The dome of the gallbladder was grasped and retracted anteriorly over the liver edge. The infundibulum was grasped and retracted laterally. Some inflammatory adhesions to the neck of the gallbladder were taken down. The triangle of Calot was then meticulously dissected by taking down the overlying peritoneum. A critical view of safety was obtained. The cystic duct and cystic artery were clearly identified and cleared of surrounding tissues. Two clips were placed on the patient side of these structures and 1 clip on the specimen side, and then both structures were divided with scissors. We then dissected the gallbladder off of the hepatic bed using hook electrocautery. The gallbladder was passed into an endocatch bag and removed through the 12 mm subxyphoid site. The clips and gallbladder fossa were then re-examined and noted to be hemostatic. There was  spillage of bile during the case. The area was thoroughly irrigated and suctioned. The 12 mm subxyphoid site was then closed using an 0-vicryl suture on a suture passer to re-approximate the fascia. The abdomen was then allowed to desufflate and all remaining trocars were removed. Additional local anesthetic was injected at all incision sites. The skin was then re-approximated using 4-0 Vicryl subcuticular stitches and dermabond skin adhesive. All needle and instrument counts were correct at the completion of the case. I was present and scrubbed throughout the entirety of the case. There were no immediate complications.       Complications: none  Implants: * No implants in log Edwin Prince MD  6/15/2022

## 2022-06-15 NOTE — PROGRESS NOTES
0700: Bedside and Verbal shift change report given to Rosamaria Betts (oncoming nurse) by Bereket Palma RN (offgoing nurse). Report included the following information SBAR, Kardex, Accordion and Cardiac Rhythm NSR.

## 2022-06-15 NOTE — PROGRESS NOTES
Verbal shift change report given to Kendra Ybarra (oncoming nurse) by Fabiana Murphy (offgoing nurse). Report included the following information ED Summary, Intake/Output and MAR.

## 2022-06-15 NOTE — DISCHARGE SUMMARY
Physician Discharge Summary     Patient ID:  Rhonda Bustamante  [id number]  46 y.o.  1995    Admit Date: 6/14/2022    Discharge Date:     Admission Diagnoses: Calculus of gallbladder with acute cholecystitis [K80.00]    Discharge Diagnoses: Active Problems:    Calculus of gallbladder with acute cholecystitis (6/14/2022)         Admission Condition: Fair    Discharge Condition: Good    Procedure(s):    [facility]2022 [facility] for this procedure. Consults: None    Disposition: home    Patient Instructions:   Current Discharge Medication List      START taking these medications    Details   HYDROcodone-acetaminophen (NORCO) 5-325 mg per tablet Take 1 Tablet by mouth every six (6) hours as needed for Pain for up to 7 days. Max Daily Amount: 4 Tablets. Qty: 30 Tablet, Refills: 0    Associated Diagnoses: Post-operative pain         CONTINUE these medications which have NOT CHANGED    Details   sertraline (ZOLOFT) 25 mg tablet Take 25 mg by mouth daily. pantoprazole (PROTONIX) 20 mg tablet Take 1 Tablet by mouth daily. Take 30 minutes before other meds and prior to eating  Qty: 30 Tablet, Refills: 0    Associated Diagnoses: Gastroesophageal reflux disease, unspecified whether esophagitis present      fluticasone propionate (FLONASE) 50 mcg/actuation nasal spray 1 spray per nostril two times per day  Qty: 1 Each, Refills: 3    Associated Diagnoses: Cough      loratadine (Claritin) 10 mg tablet Take 1 Tablet by mouth daily. Qty: 30 Tablet, Refills: 3    Associated Diagnoses: Cough      lidocaine (Lidoderm) 5 % Apply patch to the affected area for 12 hours a day and remove for 12 hours a day.   Qty: 5 Each, Refills: 0             Activity: Activity as tolerated and See surgical instructions  Diet: Regular Diet  Wound Care: As directed    Follow-up with Kylah Rogers MD in two weeks  Follow-up tests/labs none needed    20 mins of time was spent with the patient of which > 50% of the time involved face-to-face counseling of the patient regarding the proposed treatment plan.   Signed:  Thad Black MD  6/15/2022  4:37 PM

## 2022-06-15 NOTE — PERIOP NOTES
Discharge instructions reviewed with patients aunt and uncle in waiting room with verbalized understanding- all questions and concerns addressed prior to d/c. Iv removed. Patient escorted to main entrance in wheelchair with hospital staff- d/c'd in stable condition.

## 2022-06-15 NOTE — PROGRESS NOTES
Bedside and Verbal shift change report given to Rosamaria Betts (oncoming nurse) by Lorna Kolb (offgoing nurse). Report included the following information ED Summary, Procedure Summary, Intake/Output, MAR and Recent Results.

## 2022-06-15 NOTE — ANESTHESIA POSTPROCEDURE EVALUATION
Procedure(s):  CHOLECYSTECTOMY LAPAROSCOPIC. general    Anesthesia Post Evaluation      Multimodal analgesia: multimodal analgesia not used between 6 hours prior to anesthesia start to PACU discharge  Patient location during evaluation: PACU  Patient participation: complete - patient participated  Level of consciousness: awake  Pain management: adequate  Airway patency: patent  Anesthetic complications: no  Cardiovascular status: acceptable, blood pressure returned to baseline and hemodynamically stable  Respiratory status: acceptable  Hydration status: acceptable  Post anesthesia nausea and vomiting:  controlled  Final Post Anesthesia Temperature Assessment:  Normothermia (36.0-37.5 degrees C)      INITIAL Post-op Vital signs:   Vitals Value Taken Time   /65 06/15/22 1745   Temp 36.3 °C (97.3 °F) 06/15/22 1702   Pulse 67 06/15/22 1748   Resp 17 06/15/22 1748   SpO2 100 % 06/15/22 1748   Vitals shown include unvalidated device data.

## 2022-06-15 NOTE — H&P
[facility] Consultation for: cholecystitis    Requesting Physician: Dr. Cristal Haskins     History of Present Illness:      Kelly Jones is a 27 y.o. female who has a one day history of right upper quadrant abdominal pain, radiating to the back. Pain is severe and constant. She has nausea, and one episode of emesis. No fevers, diarrhea or constipation. Mild chills. She has had about a one year history of similar pain, but not as severe. No past medical history on file. No past surgical history on file. Social History     Socioeconomic History    Marital status: SINGLE     Spouse name: Not on file    Number of children: Not on file    Years of education: Not on file    Highest education level: Not on file   Occupational History    Not on file   Tobacco Use    Smoking status: Never Smoker    Smokeless tobacco: Never Used   Substance and Sexual Activity    Alcohol use: Not on file    Drug use: Not on file    Sexual activity: Not on file   Other Topics Concern    Not on file   Social History Narrative    Not on file     Social Determinants of Health     Financial Resource Strain:     Difficulty of Paying Living Expenses: Not on file   Food Insecurity:     Worried About Running Out of Food in the Last Year: Not on file    Kenji of Food in the Last Year: Not on file   Transportation Needs:     Lack of Transportation (Medical): Not on file    Lack of Transportation (Non-Medical):  Not on file   Physical Activity:     Days of Exercise per Week: Not on file    Minutes of Exercise per Session: Not on file   Stress:     Feeling of Stress : Not on file   Social Connections:     Frequency of Communication with Friends and Family: Not on file    Frequency of Social Gatherings with Friends and Family: Not on file    Attends Faith Services: Not on file    Active Member of Clubs or Organizations: Not on file    Attends Club or Organization Meetings: Not on file   Crystal Marital Status: Not on file   Intimate Partner Violence:     Fear of Current or Ex-Partner: Not on file    Emotionally Abused: Not on file    Physically Abused: Not on file    Sexually Abused: Not on file   Housing Stability:     Unable to Pay for Housing in the Last Year: Not on file    Number of [place] in the Last Year: Not on file    Unstable Housing in the Last Year: Not on file       No family history on file. Current Facility-Administered Medications:     sodium chloride (NS) flush 5-40 mL, 5-40 mL, IntraVENous, Q8H, Alison Ryder MD    sodium chloride (NS) flush 5-40 mL, 5-40 mL, IntraVENous, PRN, Alison Ryder MD    dextrose 5% - 0.45% NaCl with KCl 20 mEq/L infusion, 125 mL/hr, IntraVENous, CONTINUOUS, Alison Ryder MD    cefepime (MAXIPIME) 2 g in 0.9% sodium chloride 10 mL IV syringe, 2 g, IntraVENous, Q8H, Alison Ryder MD    metroNIDAZOLE (FLAGYL) IVPB premix 500 mg, 500 mg, IntraVENous, Q8H, Alison Ryder MD    HYDROmorphone (DILAUDID) injection 1 mg, 1 mg, IntraVENous, Q4H PRN, Alison Ryder MD    ondansetron [facility]) injection 4 mg, 4 mg, IntraVENous, Q4H PRN, Alison Ryder MD    enoxaparin (LOVENOX) injection 40 mg, 40 mg, SubCUTAneous, Q24H, Alison Ryder MD    Current Outpatient Medications:     pantoprazole (PROTONIX) 20 mg tablet, Take 1 Tablet by mouth daily. Take 30 minutes before other meds and prior to eating, Disp: 30 Tablet, Rfl: 0    fluticasone propionate (FLONASE) 50 mcg/actuation nasal spray, 1 spray per nostril two times per day (Patient not taking: Reported on 5/11/2022), Disp: 1 Each, Rfl: 3    loratadine (Claritin) 10 mg tablet, Take 1 Tablet by mouth daily. , Disp: 30 Tablet, Rfl: 3    lidocaine (Lidoderm) 5 %, Apply patch to the affected area for 12 hours a day and remove for 12 hours a day., Disp: 5 Each, Rfl: 0    No Known Allergies    ROS   Constitutional: positive for chills  Ears, Nose, Mouth, Throat, and Face: negative  Respiratory: negative  Cardiovascular: negative  Gastrointestinal: as above  Genitourinary:negative  Integument/Breast: negative  Hematologic/Lymphatic: negative  Behavioral/Psychiatric: negative  Allergic/Immunologic: negative      Physical Exam:     Visit Vitals  /79   Pulse 82   Temp 98.3 °F (36.8 °C)   Resp 16   Ht 5' 5\" (1.651 m)   Wt 215 lb (97.5 kg)   SpO2 99%   BMI 35.78 kg/m²       General - alert and oriented, no apparent distress, well developed  HEENT - NC/AT, no scleral icterus  Pulm - CTAB, normal inspiratory effort  CV - RRR, no M/R/G  Abd - soft, ND, TTP RUQ with guarding and positive Patten sign  Ext - warm, well perfused, no edema  Skin - supple and no rashes  Psychiatric - normal affect, good mood    Labs  Recent Results (from the past 24 hour(s))   CBC WITH AUTOMATED DIFF    Collection Time: 06/14/22  3:53 PM   Result Value Ref Range    WBC 7.4 3.6 - 11.0 K/uL    RBC 4.84 3.80 - 5.20 M/uL    HGB 12.8 11.5 - 16.0 g/dL    HCT 37.9 35.0 - 47.0 %    MCV 78.3 (L) 80.0 - 99.0 FL    MCH 26.4 26.0 - 34.0 PG    MCHC 33.8 30.0 - 36.5 g/dL    RDW 12.2 11.5 - 14.5 %    PLATELET 144 336 - 667 K/uL    MPV 10.3 8.9 - 12.9 FL    NRBC 0.0 0  WBC    ABSOLUTE NRBC 0.00 0.00 - 0.01 K/uL    NEUTROPHILS 66 32 - 75 %    LYMPHOCYTES 26 12 - 49 %    MONOCYTES 7 5 - 13 %    EOSINOPHILS 1 0 - 7 %    BASOPHILS 0 0 - 1 %    IMMATURE GRANULOCYTES 0 0.0 - 0.5 %    ABS. NEUTROPHILS 4.9 1.8 - 8.0 K/UL    ABS. LYMPHOCYTES 1.9 0.8 - 3.5 K/UL    ABS. MONOCYTES 0.5 0.0 - 1.0 K/UL    ABS. EOSINOPHILS 0.1 0.0 - 0.4 K/UL    ABS. BASOPHILS 0.0 0.0 - 0.1 K/UL    ABS. IMM.  GRANS. 0.0 0.00 - 0.04 K/UL    DF AUTOMATED     METABOLIC PANEL, COMPREHENSIVE    Collection Time: 06/14/22  3:53 PM   Result Value Ref Range    Sodium 140 136 - 145 mmol/L    Potassium 3.9 3.5 - 5.1 mmol/L    Chloride 108 97 - 108 mmol/L    CO2 26 21 - 32 mmol/L    Anion gap 6 5 - 15 mmol/L    Glucose 154 (H) 65 - 100 mg/dL    BUN 15 6 - 20 MG/DL Creatinine 1.16 (H) 0.55 - 1.02 MG/DL    BUN/Creatinine ratio 13 12 - 20      GFR est AA >60 >60 ml/min/1.73m2    GFR est non-AA 56 (L) >60 ml/min/1.73m2    Calcium 9.2 8.5 - 10.1 MG/DL    Bilirubin, total 0.5 0.2 - 1.0 MG/DL    ALT (SGPT) 51 12 - 78 U/L    AST (SGOT) 26 15 - 37 U/L    Alk. phosphatase 66 45 - 117 U/L    Protein, total 8.0 6.4 - 8.2 g/dL    Albumin 3.6 3.5 - 5.0 g/dL    Globulin 4.4 (H) 2.0 - 4.0 g/dL    A-G Ratio 0.8 (L) 1.1 - 2.2     LIPASE    Collection Time: 06/14/22  3:53 PM   Result Value Ref Range    Lipase 154 73 - 393 U/L   URINALYSIS W/MICROSCOPIC    Collection Time: 06/14/22  4:03 PM   Result Value Ref Range    Color YELLOW/STRAW      Appearance CLOUDY (A) CLEAR      Specific gravity 1.022 1.003 - 1.030      pH (UA) 5.5 5.0 - 8.0      Protein Negative NEG mg/dL    Glucose Negative NEG mg/dL    Ketone TRACE (A) NEG mg/dL    Bilirubin Negative NEG      Blood Negative NEG      Urobilinogen 1.0 0.2 - 1.0 EU/dL    Nitrites Negative NEG      Leukocyte Esterase LARGE (A) NEG      WBC >100 (H) 0 - 4 /hpf    RBC 0-5 0 - 5 /hpf    Epithelial cells MANY (A) FEW /lpf    Bacteria 1+ (A) NEG /hpf    Hyaline cast 0-2 0 - 2 /lpf   URINE CULTURE HOLD SAMPLE    Collection Time: 06/14/22  4:03 PM    Specimen: Serum; Urine   Result Value Ref Range    Urine culture hold        Urine on hold in Microbiology dept for 2 days. If unpreserved urine is submitted, it cannot be used for addtional testing after 24 hours, recollection will be required. Imaging  US:  FINDINGS:   Right upper quadrant ultrasonographic images of the abdomen were obtained using  a curved array transducer. GALLBLADDER: There is cholelithiasis. No bladder wall thickening. Sonographic  Ptaten sign. No pericholecystic edema or fluid. COMMON DUCT: 3 millimeters in diameter. No visualized calculi. Clement Fly LIVER: Increased in echogenicity. No duct dilatation. No mass lesion. MAIN PORTAL VEIN: Patent.  Appropriate hepatopetal flow.  PANCREAS: Incompletely visualized secondary to overlying bowel gas. No definite  mass or ductal dilatation is evident. RIGHT KIDNEY: Normal in size. No hydronephrosis, shadowing calculus, or  contour-deforming renal mass.     IMPRESSION  Imaging findings concerning for acute cholecystitis.     Increased hepatic echogenicity is most likely related to hepatic steatosis.     I have personally reviewed all of the pertinent images     Assessment:     Bradly Jeans is a 27 y.o. female with signs and symptoms of acute cholecystitis. She does not have leukocytosis, which suggests it is early, but exam is consistent with cholecystitis. Recommendations:     1. Admit to observation status. Plan for laparoscopic/robotic cholecystectomy. Patient recently had liquids, so will plan on surgery in AM. NPO after midnight, IV antibiotics and IVF in meantime. 30 mins of time was spent with the patient of which > 50% of the time involved face-to-face counseling of the patient regarding the proposed treatment plan.     Mariela Garcia MD

## 2022-06-17 ENCOUNTER — TELEPHONE (OUTPATIENT)
Dept: SURGERY | Age: 27
End: 2022-06-17

## 2022-06-17 NOTE — TELEPHONE ENCOUNTER
Pt is 2 days postop cholecystectomy laparoscopic. Pt was called with the assist of [employer]  ID [id number] and verified using 2 patient identifiers. How are you doing: Pt reports that she is having a lot of incisional pain and some back pain. Pt informed that she can try heating pad to her back and Advil as needed if she is able to take it. Are you having any pain: Yes ___x___           No ______  If yes level:8/10    Are you taking pain meds:Yes       If yes- recommended any OTC constipation treatment if needed- rec to take any otc laxative mom,miralax or dulcolax    Have you had any nausea or vomiting: Yes _______    No ___x____    How is your appetite (eating & drinking):reports that she is eating and drinking without problems    Have you moved your bowels since surgery: Yes ______       No ___x___    Any issues with urination: Yes _____        No __x____    Pt notified to take dressing off after 48 hrs: Yes _______    No ______Pt has derma bond intact to the incision     Do they have a drain:  Yes ______  No ___x___    Are they keeping track of output: Yes ______        No ______n/a    Did they review their discharge instructions:  Yes __x____   No ______    Any other concerns:Pt wanted to know if she was able to shower- informed her that she is able to shower but no ana cristina baths until she has been seen by the Provider. Pt is to call the office back with any questions or concerns.     Your follow up office appt is:  Pt was transferred to [facility] to get schedule a 2 week postop with Dr. Feliz Chimera

## 2022-06-28 ENCOUNTER — OFFICE VISIT (OUTPATIENT)
Dept: SURGERY | Age: 27
End: 2022-06-28
Payer: COMMERCIAL

## 2022-06-28 VITALS
SYSTOLIC BLOOD PRESSURE: 107 MMHG | OXYGEN SATURATION: 98 % | DIASTOLIC BLOOD PRESSURE: 62 MMHG | HEIGHT: 65 IN | TEMPERATURE: 99 F | RESPIRATION RATE: 20 BRPM | WEIGHT: 193 LBS | HEART RATE: 72 BPM | BODY MASS INDEX: 32.15 KG/M2

## 2022-06-28 DIAGNOSIS — Z09 POSTOPERATIVE EXAMINATION: Primary | ICD-10-CM

## 2022-06-28 PROCEDURE — 99024 POSTOP FOLLOW-UP VISIT: CPT | Performed by: SURGERY

## 2022-06-28 NOTE — PROGRESS NOTES
[facility], [facility]      Clinic Note - Follow up    Subjective     Violeta Parnell returns for scheduled follow up today. She underwent laparoscopic cholecystectomy. She has had some pain since surgery, particularly around one of the incisions. She is also had some constipation. This is improving with MiraLAX. She denies fever or chills. Objective     Visit Vitals  /62 (BP 1 Location: Left upper arm, BP Patient Position: Sitting, BP Cuff Size: Adult)   Pulse 72   Temp 99 °F (37.2 °C) (Oral)   Resp 20   Ht 5' 5\" (1.651 m)   Wt 193 lb (87.5 kg)   SpO2 98%   BMI 32.12 kg/m²         PE  GEN - Awake, alert, communicating appropriately. NAD  Pulm - CTAB  CV - RRR  Abd - soft, ND. TTP near RUQ incisions, no pain with inspiration, no erythema, incisions intact  Ext - warm, well perfused, no edema. All other systems negative unless indicated above. Assessment     Corinne Bowers is a 27 y. o.yr old female who is post laparoscopic cholecystectomy for acute cholecystitis. She does have some pain, but this appears appropriate for this point in her postoperative course. There is no evidence for infection or other postoperative complication. Plan     Further wound care instructions were given. She is released from all restrictions with regards to lifting weight and activity as well as swimming or bathing at this point. I did recommend that she come back to see me if her pain or constipation do not improve. Otherwise, she may follow-up with me on an as-needed basis. 15 mins of time was spent with the patient of which > 50% of the time involved face-to-face counseling of the patient regarding the proposed treatment plan.       Sindy Altman MD  6/28/2022    CC: Peg Gong

## 2022-06-28 NOTE — PROGRESS NOTES
1. Have you been to the ER, urgent care clinic since your last visit? Hospitalized since your last visit? No    2. Have you seen or consulted any other health care providers outside of the [address] [place] [address] since your last visit? Include any pap smears or colon screening. No      [employer] assisted with communication during today's visit.  ID [id number]

## (undated) DEVICE — SOL IRRIGATION INJ NACL 0.9% 500ML BTL

## (undated) DEVICE — TROCAR: Brand: KII® SLEEVE

## (undated) DEVICE — LAPAROSCOPIC TROCAR SLEEVE/SINGLE USE: Brand: KII® OPTICAL ACCESS SYSTEM

## (undated) DEVICE — INSUFFLATION NEEDLE: Brand: SURGINEEDLE

## (undated) DEVICE — GLOVE ORANGE PI 7   MSG9070

## (undated) DEVICE — SUTURE SZ 0 27IN 5/8 CIR UR-6  TAPER PT VIOLET ABSRB VICRYL J603H

## (undated) DEVICE — GENERAL LAPAROSCOPY-SFMC: Brand: MEDLINE INDUSTRIES, INC.

## (undated) DEVICE — TISSUE RETRIEVAL SYSTEM: Brand: INZII RETRIEVAL SYSTEM

## (undated) DEVICE — Device

## (undated) DEVICE — LAPAROSCOPIC WIRE L-HOOK ELECTRODE COATED: Brand: CLEANCOAT

## (undated) DEVICE — SUTURE VCRL SZ 4-0 L27IN ABSRB UD L19MM PS-2 3/8 CIR PRIM J426H

## (undated) DEVICE — CLIP INT L POLYMER LOK LIG HEM O LOK

## (undated) DEVICE — CANISTER, RIGID, 3000CC: Brand: MEDLINE INDUSTRIES, INC.

## (undated) DEVICE — CLICKLINE SCISSORS INSERT: Brand: CLICKLINE

## (undated) DEVICE — TRANSFER SET 3": Brand: MEDLINE INDUSTRIES, INC.

## (undated) DEVICE — TROCAR: Brand: KII® OPTICAL ACCESS SYSTEM

## (undated) DEVICE — DERMABOND SKIN ADH 0.7ML -- DERMABOND ADVANCED 12/BX